# Patient Record
Sex: MALE | Race: WHITE | NOT HISPANIC OR LATINO | Employment: PART TIME | URBAN - METROPOLITAN AREA
[De-identification: names, ages, dates, MRNs, and addresses within clinical notes are randomized per-mention and may not be internally consistent; named-entity substitution may affect disease eponyms.]

---

## 2017-02-04 DIAGNOSIS — E08.9 DIABETES MELLITUS DUE TO UNDERLYING CONDITION WITHOUT COMPLICATION, WITHOUT LONG-TERM CURRENT USE OF INSULIN: ICD-10-CM

## 2017-02-06 RX ORDER — LIRAGLUTIDE 6 MG/ML
INJECTION SUBCUTANEOUS
Qty: 6 SYRINGE | Refills: 3 | Status: SHIPPED | OUTPATIENT
Start: 2017-02-06 | End: 2017-12-18 | Stop reason: SDUPTHER

## 2017-03-03 ENCOUNTER — OFFICE VISIT (OUTPATIENT)
Dept: INTERNAL MEDICINE | Facility: CLINIC | Age: 62
End: 2017-03-03
Payer: COMMERCIAL

## 2017-03-03 VITALS
WEIGHT: 179 LBS | HEART RATE: 86 BPM | RESPIRATION RATE: 18 BRPM | BODY MASS INDEX: 28.77 KG/M2 | HEIGHT: 66 IN | DIASTOLIC BLOOD PRESSURE: 70 MMHG | OXYGEN SATURATION: 97 % | SYSTOLIC BLOOD PRESSURE: 112 MMHG | TEMPERATURE: 99 F

## 2017-03-03 DIAGNOSIS — I10 BENIGN ESSENTIAL HTN: ICD-10-CM

## 2017-03-03 DIAGNOSIS — N52.9 ERECTILE DYSFUNCTION, UNSPECIFIED ERECTILE DYSFUNCTION TYPE: ICD-10-CM

## 2017-03-03 DIAGNOSIS — E78.5 HYPERLIPIDEMIA WITH TARGET LDL LESS THAN 130: ICD-10-CM

## 2017-03-03 DIAGNOSIS — E08.9 DIABETES MELLITUS DUE TO UNDERLYING CONDITION WITHOUT COMPLICATION, WITHOUT LONG-TERM CURRENT USE OF INSULIN: Primary | ICD-10-CM

## 2017-03-03 PROCEDURE — 99214 OFFICE O/P EST MOD 30 MIN: CPT | Mod: S$GLB,,, | Performed by: INTERNAL MEDICINE

## 2017-03-03 PROCEDURE — 3078F DIAST BP <80 MM HG: CPT | Mod: S$GLB,,, | Performed by: INTERNAL MEDICINE

## 2017-03-03 PROCEDURE — 3074F SYST BP LT 130 MM HG: CPT | Mod: S$GLB,,, | Performed by: INTERNAL MEDICINE

## 2017-03-03 PROCEDURE — 1160F RVW MEDS BY RX/DR IN RCRD: CPT | Mod: S$GLB,,, | Performed by: INTERNAL MEDICINE

## 2017-03-03 RX ORDER — PEN NEEDLE, DIABETIC 31 GX5/16"
NEEDLE, DISPOSABLE MISCELLANEOUS
Qty: 100 EACH | Refills: 11 | Status: SHIPPED | OUTPATIENT
Start: 2017-03-03 | End: 2018-04-10 | Stop reason: SDUPTHER

## 2017-03-03 RX ORDER — PEN NEEDLE, DIABETIC 31 GX5/16"
NEEDLE, DISPOSABLE MISCELLANEOUS
Refills: 11 | COMMUNITY
Start: 2016-11-30 | End: 2017-03-03 | Stop reason: SDUPTHER

## 2017-03-03 RX ORDER — SILDENAFIL 50 MG/1
50 TABLET, FILM COATED ORAL DAILY PRN
Qty: 30 TABLET | Refills: 5 | Status: SHIPPED | OUTPATIENT
Start: 2017-03-03 | End: 2017-10-30

## 2017-03-03 NOTE — MR AVS SNAPSHOT
"    [facility]  [address],  [address]  [place] [address]  Phone: [phone]  Fax: [phone]                  Rigoberto Quintana   3/3/2017 8:00 AM   Office Visit    Description:  Male : 1955   Provider:  China Gongora MD   Department:  [facility]           Reason for Visit     Results     Diabetes     Hypertension     Hyperlipidemia     Medication Refill           Diagnoses this Visit        Comments    Diabetes mellitus due to underlying condition without complication, without long-term current use of insulin    -  Primary     Erectile dysfunction, unspecified erectile dysfunction type         Hyperlipidemia with target LDL less than 130         Benign essential HTN                To Do List           Future Appointments        Provider Department Dept Phone    2017 7:40 AM China Gongora MD [facility] [phone]      Goals (5 Years of Data)     None      Follow-Up and Disposition     Return in about 3 months (around 6/3/2017).    Follow-up and Disposition History       These Medications        Disp Refills Start End    BD ULTRA-FINE SONAL PEN NEEDLES 32 gauge x 5/32" Ndle 100 each 11 3/3/2017     USE WHICHEVER NEEDLE COMBINES WITH THE VICTOZA PEN PLEASE.( MICRO ).    Pharmacy: [facility]/pharmacy #[id number] - [place] - [address] AT [facility] Ph #: [phone]       sildenafil (VIAGRA) 50 MG tablet 30 tablet 5 3/3/2017 3/3/2018    Take 1 tablet (50 mg total) by mouth daily as needed for Erectile Dysfunction. - Oral    Pharmacy: [facility]/pharmacy #[id number] - [place] - [address] AT [facility] Ph #: [phone]         [facility]     [facility] Care Line -  Assistance  Registered nurses in the [facility] provide clinical advisement, health education, appointment booking, and other advisory services.  Call for this free service at " "[phone].             Medications           START taking these NEW medications        Refills    BD ULTRA-FINE SONAL PEN NEEDLES 32 gauge x 5/32" Ndle 11    Sig: USE WHICHEVER NEEDLE COMBINES WITH THE VICTOZA PEN PLEASE.( MICRO ).    Class: Normal      STOP taking these medications     pen needle, diabetic 31 gauge x 1/4" Ndle Use whichever needle combines with the Victoza pen please.(  MICRO ).           Verify that the below list of medications is an accurate representation of the medications you are currently taking.  If none reported, the list may be blank. If incorrect, please contact your healthcare provider. Carry this list with you in case of emergency.           Current Medications     aspirin 81 MG Chew Take 81 mg by mouth once daily.    hydrochlorothiazide (HYDRODIURIL) 25 MG tablet Take 1 tablet (25 mg total) by mouth once daily.    losartan (COZAAR) 50 MG tablet Take 1 tablet (50 mg total) by mouth once daily.    pravastatin (PRAVACHOL) 80 MG tablet Take 1 tablet (80 mg total) by mouth once daily.    sildenafil (VIAGRA) 50 MG tablet Take 1 tablet (50 mg total) by mouth daily as needed for Erectile Dysfunction.    SITagliptan-metformin (JANUMET XR) 100-1,000 mg TM24 Take 1 tablet by mouth once daily.    VICTOZA 2-DESTIN 0.6 mg/0.1 mL (18 mg/3 mL) PnIj INJECT 0.6 MG INTO THE SKIN ONCE DAILY.    BD ULTRA-FINE SONAL PEN NEEDLES 32 gauge x 5/32" Ndle USE WHICHEVER NEEDLE COMBINES WITH THE VICTOZA PEN PLEASE.( MICRO ).           Clinical Reference Information           Your Vitals Were     BP Pulse Temp Resp Height Weight    112/70 (BP Location: Left arm, Patient Position: Sitting, BP Method: Manual) 86 98.8 °F (37.1 °C) (Oral) 18 5' 6" (1.676 m) 81.2 kg (179 lb 0.2 oz)    SpO2 BMI             97% 28.89 kg/m2         Blood Pressure          Most Recent Value    BP  112/70      Allergies as of 3/3/2017     No Known Allergies      Immunizations Administered on Date of Encounter - 3/3/2017     None      Orders " Placed During Today's Visit     Future Labs/Procedures Expected by Expires    Hemoglobin A1c  6/1/2017 8/30/2017    Microalbumin/creatinine urine ratio  6/1/2017 3/3/2018      [facility] Sign-Up     Activating your [facility] account is as easy as 1-2-3!     1) Visit [email or web address], select Sign Up Now, enter this activation code and your date of birth, then select Next.  [email or web address]  Expires: 4/17/2017  8:43 AM      2) Create a username and password to use when you visit [facility] in the future and select a security question in case you lose your password and select Next.    3) Enter your e-mail address and click Sign Up!    Additional Information  If you have questions, please e-mail [email or web address] or call [phone] to talk to our [facility] staff. Remember, [facility] is NOT to be used for urgent needs. For medical emergencies, dial 911.         Language Assistance Services     ATTENTION: Language assistance services are available, free of charge. Please call [phone].      ATENCIÓN: Si habla español, tiene a cavanaugh disposición servicios gratuitos de asistencia lingüística. Llame al [phone].     CHÚ Ý: N?u b?n nói Ti?ng Vi?t, có các d?ch v? h? tr? ngôn ng? mi?n phí dành cho b?n. G?i s? [phone].         [facility] complies with applicable Federal civil rights laws and does not discriminate on the basis of race, color, national origin, age, disability, or sex.

## 2017-03-03 NOTE — PROGRESS NOTES
Subjective:      Patient ID: Rigoberto Quintana is a 62 y.o. male.    Chief Complaint: Results (pt in for lab results); Diabetes; Hypertension; Hyperlipidemia; and Medication Refill (vonda needles need refill )    HPI:Due to the holidays, and Mardi Gras, there were some days of indescretion.  Otherwise doing very well.           03/01/17 0856 HDL 58 - Final result   03/01/17 0856 CHOL 167 - Final result   03/01/17 0856 TRIG 70 - Final result   03/01/17 0856 LDLCALC 95.0 - Final result   03/01/17 0856 CHOLHDL 34.7 - Final result   03/01/17 0856 NONHDLCHOL 109 - Final result   03/01/17 0856 TOTALCHOLEST 2.9 - Final result   03/01/17 0856 HGBA1C 7.3 High Final result   03/01/17 0856 WBC 9.08 - Final result   03/01/17 0856 RBC 4.82 - Final result   03/01/17 0856 HGB 14.9 - Final result   03/01/17 0856 HCT 43.7 - Final result   03/01/17 0856 MCH 30.9 - Final result   03/01/17 0856 RDW 13.1 - Final result   03/01/17 0856  - Final result   03/01/17 0856 MPV 10.8 - Final result   03/01/17 0942  High Final result   03/01/17 0942 BUN 20 - Final result   03/01/17 0942 CREATININE 0.97 - Final result   03/01/17 0942 CALCIUM 9.5 - Final result   03/01/17 0942  - Final result   03/01/17 0942 K 4.2 - Final result   03/01/17 0942  - Final result   03/01/17 0942 PROT 8.3 - Final result   03/01/17 0942 ALBUMIN 4.7 - Final result   03/01/17 0942 BILITOT 0.8 - Final result   03/01/17 0942 AST 33 - Final result   03/01/17 0942 ALKPHOS 68 - Final result   03/01/17 0942 CO2 30 High Final result   03/01/17 0942 ALT 32 - Final result   03/01/17 0942 ANIONGAP 12 - Final result   03/01/17 0942 EGFRNONAA >60.0 - Final result   03/01/17 0942 ESTGFRAFRICA >60.0 - Final result   03/01/17 0856           Review of Systems   Constitutional: Negative.    HENT: Negative.    Respiratory: Negative.    Cardiovascular: Negative.    Gastrointestinal: Negative.    Endocrine: Positive for polyuria.   Genitourinary: Positive for  "frequency.   Musculoskeletal: Negative.    Allergic/Immunologic: Negative.    Neurological: Negative.    Psychiatric/Behavioral: Negative.    All other systems reviewed and are negative.      Objective:   /70 (BP Location: Left arm, Patient Position: Sitting, BP Method: Manual)  Pulse 86  Temp 98.8 °F (37.1 °C) (Oral)   Resp 18  Ht 5' 6" (1.676 m)  Wt 81.2 kg (179 lb 0.2 oz)  SpO2 97%  BMI 28.89 kg/m2    Physical Exam   Constitutional: He is oriented to person, place, and time. He appears well-developed and well-nourished.   HENT:   Head: Normocephalic and atraumatic.   Mouth/Throat: Oropharynx is clear and moist.   Eyes: Conjunctivae are normal. Pupils are equal, round, and reactive to light.   Neck: Normal range of motion. Neck supple.   Cardiovascular: Normal rate, regular rhythm and normal heart sounds.    Pulmonary/Chest: Effort normal and breath sounds normal.   Abdominal: Soft. Bowel sounds are normal.   Musculoskeletal: Normal range of motion.   Neurological: He is alert and oriented to person, place, and time.   Skin: Skin is warm and dry.   Psychiatric: He has a normal mood and affect. His behavior is normal.   Nursing note and vitals reviewed.      Assessment:     1. Diabetes mellitus due to underlying condition without complication, without long-term current use of insulin    2. Erectile dysfunction, unspecified erectile dysfunction type    3. Hyperlipidemia with target LDL less than 130    4. Benign essential HTN      Plan:     Diabetes mellitus due to underlying condition without complication, without long-term current use of insulin  -     BD ULTRA-FINE SONAL PEN NEEDLES 32 gauge x 5/32" Ndle; USE WHICHEVER NEEDLE COMBINES WITH THE VICTOZA PEN PLEASE.( MICRO ).  Dispense: 100 each; Refill: 11  -     Hemoglobin A1c; Future; Expected date: 6/1/17  -     Microalbumin/creatinine urine ratio; Future; Expected date: 6/1/17    Erectile dysfunction, unspecified erectile dysfunction type  -     " sildenafil (VIAGRA) 50 MG tablet; Take 1 tablet (50 mg total) by mouth daily as needed for Erectile Dysfunction.  Dispense: 30 tablet; Refill: 5    Hyperlipidemia with target LDL less than 130    Benign essential HTN

## 2017-05-11 DIAGNOSIS — Z12.5 SCREENING PSA (PROSTATE SPECIFIC ANTIGEN): ICD-10-CM

## 2017-05-11 DIAGNOSIS — E78.5 HYPERLIPIDEMIA, UNSPECIFIED HYPERLIPIDEMIA TYPE: Primary | ICD-10-CM

## 2017-05-11 DIAGNOSIS — E11.9 TYPE 2 DIABETES MELLITUS WITHOUT COMPLICATION, UNSPECIFIED LONG TERM INSULIN USE STATUS: ICD-10-CM

## 2017-05-11 DIAGNOSIS — I10 BENIGN HYPERTENSION: ICD-10-CM

## 2017-06-13 ENCOUNTER — OFFICE VISIT (OUTPATIENT)
Dept: INTERNAL MEDICINE | Facility: CLINIC | Age: 62
End: 2017-06-13
Payer: COMMERCIAL

## 2017-06-13 VITALS
WEIGHT: 178 LBS | OXYGEN SATURATION: 98 % | RESPIRATION RATE: 18 BRPM | SYSTOLIC BLOOD PRESSURE: 120 MMHG | HEART RATE: 82 BPM | BODY MASS INDEX: 28.61 KG/M2 | HEIGHT: 66 IN | DIASTOLIC BLOOD PRESSURE: 65 MMHG | TEMPERATURE: 98 F

## 2017-06-13 DIAGNOSIS — I10 BENIGN ESSENTIAL HTN: Primary | ICD-10-CM

## 2017-06-13 DIAGNOSIS — E78.5 HYPERLIPIDEMIA WITH TARGET LDL LESS THAN 130: ICD-10-CM

## 2017-06-13 DIAGNOSIS — E08.9 DIABETES MELLITUS DUE TO UNDERLYING CONDITION WITHOUT COMPLICATION, WITHOUT LONG-TERM CURRENT USE OF INSULIN: ICD-10-CM

## 2017-06-13 PROCEDURE — 99214 OFFICE O/P EST MOD 30 MIN: CPT | Mod: S$GLB,,, | Performed by: INTERNAL MEDICINE

## 2017-06-13 NOTE — PROGRESS NOTES
"Subjective:      Patient ID: Rigoberto Quintana is a 62 y.o. male.    Chief Complaint: Results (lab results.....send all lab request to [facility] PLEASE); Diabetes; and Medication Refill (pt requesting 90 day supply)    HPI:  62y/oWM seen by:  -Dr. Singh,Cardio  -,[facility]  Has just recently retired, and having a life changing experience.  Exercising 3xweek.  HAS TO GO TO [facility].    HGBA1C 7.2  All the rest negative abnormals.    Review of Systems   Respiratory: Positive for chest tightness. Negative for shortness of breath.    Cardiovascular: Negative for chest pain and palpitations.   All other systems reviewed and are negative.      Objective:   /65 (BP Location: Right arm, Patient Position: Sitting, BP Method: Manual)   Pulse 82   Temp 98.3 °F (36.8 °C) (Oral)   Resp 18   Ht 5' 6" (1.676 m)   Wt 80.7 kg (178 lb 0.3 oz)   SpO2 98%   BMI 28.73 kg/m²     Physical Exam   Constitutional: He is oriented to person, place, and time. He appears well-nourished.   HENT:   Head: Normocephalic and atraumatic.   Mouth/Throat: Oropharynx is clear and moist.   bilat hearing aids.   Eyes: Conjunctivae are normal. Pupils are equal, round, and reactive to light.   Neck: Normal range of motion. Neck supple.   Cardiovascular: Normal rate, regular rhythm and normal heart sounds.    Pulmonary/Chest: Effort normal and breath sounds normal.   Abdominal: Soft. Bowel sounds are normal.   Musculoskeletal: Normal range of motion.   Neurological: He is alert and oriented to person, place, and time.   Skin: Skin is warm and dry.   Psychiatric: He has a normal mood and affect. His behavior is normal.   Nursing note and vitals reviewed.      Assessment:     1. Benign essential HTN    2. Diabetes mellitus due to underlying condition without complication, without long-term current use of insulin    3. Hyperlipidemia with target LDL less than 130    Reviewed meds and labs.  Plan:     Benign essential HTN  -     Basic metabolic " panel; Future; Expected date: 09/13/2017  -     CBC auto differential; Future; Expected date: 09/13/2017    Diabetes mellitus due to underlying condition without complication, without long-term current use of insulin  -     Hemoglobin A1c; Future; Expected date: 09/11/2017  -     Microalbumin/creatinine urine ratio; Future; Expected date: 09/11/2017    Hyperlipidemia with target LDL less than 130  -     Lipid panel; Future; Expected date: 09/11/2017

## 2017-09-15 ENCOUNTER — DOCUMENTATION ONLY (OUTPATIENT)
Dept: INTERNAL MEDICINE | Facility: CLINIC | Age: 62
End: 2017-09-15

## 2017-09-15 NOTE — PROGRESS NOTES
Subjective:      Patient ID: Rigoberto Quintana is a 62 y.o. male.    Chief Complaint: No chief complaint on file.    HPI: 62 y.o. White male        Review of Systems    Objective:   There were no vitals taken for this visit.    Physical Exam    Assessment:     No diagnosis found.  Plan:     There are no diagnoses linked to this encounter.

## 2017-09-19 ENCOUNTER — TELEPHONE (OUTPATIENT)
Dept: INTERNAL MEDICINE | Facility: CLINIC | Age: 62
End: 2017-09-19

## 2017-09-19 DIAGNOSIS — I10 BENIGN ESSENTIAL HTN: Primary | ICD-10-CM

## 2017-09-19 DIAGNOSIS — E08.9 DIABETES MELLITUS DUE TO UNDERLYING CONDITION WITHOUT COMPLICATION, WITHOUT LONG-TERM CURRENT USE OF INSULIN: ICD-10-CM

## 2017-09-19 DIAGNOSIS — E78.5 HYPERLIPIDEMIA WITH TARGET LDL LESS THAN 130: ICD-10-CM

## 2017-09-19 NOTE — TELEPHONE ENCOUNTER
----- Message from Aura Dubose sent at 9/19/2017  9:20 AM CDT -----  Contact: patient   Patient needs lab orders put in on a [facility] form for our lab. He would like to go on Oct.13th at 7:30

## 2017-10-10 ENCOUNTER — TELEPHONE (OUTPATIENT)
Dept: INTERNAL MEDICINE | Facility: CLINIC | Age: 62
End: 2017-10-10

## 2017-10-10 DIAGNOSIS — E78.5 HYPERLIPIDEMIA, UNSPECIFIED HYPERLIPIDEMIA TYPE: ICD-10-CM

## 2017-10-10 DIAGNOSIS — I10 ESSENTIAL HYPERTENSION: Primary | ICD-10-CM

## 2017-10-10 DIAGNOSIS — E11.9 DIABETES MELLITUS WITHOUT COMPLICATION: ICD-10-CM

## 2017-10-10 NOTE — TELEPHONE ENCOUNTER
----- Message from Aura Dubose sent at 10/10/2017  1:02 PM CDT -----  Contact: Patient   Patient would like to know if his lab orders are in at [facility].  Call [phone]

## 2017-10-17 DIAGNOSIS — I10 BENIGN ESSENTIAL HTN: ICD-10-CM

## 2017-10-17 DIAGNOSIS — E78.5 HYPERLIPIDEMIA WITH TARGET LDL LESS THAN 130: ICD-10-CM

## 2017-10-18 RX ORDER — SITAGLIPTIN AND METFORMIN HYDROCHLORIDE 1000; 100 MG/1; MG/1
1 TABLET, FILM COATED, EXTENDED RELEASE ORAL DAILY
Qty: 90 TABLET | Refills: 3 | Status: SHIPPED | OUTPATIENT
Start: 2017-10-18

## 2017-10-18 RX ORDER — HYDROCHLOROTHIAZIDE 25 MG/1
25 TABLET ORAL DAILY
Qty: 90 TABLET | Refills: 3 | Status: SHIPPED | OUTPATIENT
Start: 2017-10-18

## 2017-10-18 RX ORDER — LOSARTAN POTASSIUM 50 MG/1
50 TABLET ORAL DAILY
Qty: 90 TABLET | Refills: 3 | Status: SHIPPED | OUTPATIENT
Start: 2017-10-18

## 2017-10-30 ENCOUNTER — OFFICE VISIT (OUTPATIENT)
Dept: INTERNAL MEDICINE | Facility: CLINIC | Age: 62
End: 2017-10-30
Payer: COMMERCIAL

## 2017-10-30 VITALS
SYSTOLIC BLOOD PRESSURE: 122 MMHG | HEART RATE: 71 BPM | DIASTOLIC BLOOD PRESSURE: 70 MMHG | BODY MASS INDEX: 27.8 KG/M2 | TEMPERATURE: 99 F | RESPIRATION RATE: 18 BRPM | WEIGHT: 173 LBS | HEIGHT: 66 IN | OXYGEN SATURATION: 98 %

## 2017-10-30 DIAGNOSIS — E08.9 DIABETES MELLITUS DUE TO UNDERLYING CONDITION WITHOUT COMPLICATION, WITHOUT LONG-TERM CURRENT USE OF INSULIN: Primary | ICD-10-CM

## 2017-10-30 DIAGNOSIS — E78.5 HYPERLIPIDEMIA WITH TARGET LDL LESS THAN 130: ICD-10-CM

## 2017-10-30 DIAGNOSIS — I10 BENIGN ESSENTIAL HTN: ICD-10-CM

## 2017-10-30 PROCEDURE — 99214 OFFICE O/P EST MOD 30 MIN: CPT | Mod: S$GLB,,, | Performed by: INTERNAL MEDICINE

## 2017-10-30 NOTE — PROGRESS NOTES
"Subjective:      Patient ID: Rigoberto Quintana is a 62 y.o. male.    Chief Complaint: Follow-up (6 month follow up) and Results (Lab Results)    HPI: 62 y.o. White male  , going to [place] to baby sit his 2 grandchildren while  The parents go to [place] to set up tele medicine.  He is going to get the Hep A vaccine.  Otherwise doing well.  Went to Optho normal...  Went to Cardio..  /70 (BP Location: Left arm, Patient Position: Sitting, BP Method: Large (Manual))   Pulse 71   Temp 98.6 °F (37 °C) (Oral)   Resp 18   Ht 5' 6" (1.676 m)   Wt 78.5 kg (173 lb)   SpO2 98%   BMI 27.92 kg/m²                                                                                   .    10/13/17 0818 TSH 1.250 - Final result   10/13/17 0818 HDL 56 - Final result   10/13/17 0818 CHOL 146 - Final result   10/13/17 0818 TRIG 68 - Final result   10/13/17 0818 LDLCALC 76.4 - Final result   10/13/17 0818 CHOLHDL 38.4 - Final result   10/13/17 0818 NONHDLCHOL 90 - Final result   10/13/17 0818 TOTALCHOLEST 2.6 - Final result   10/13/17 0818 HGBA1C 6.8 High Final result   10/13/17 0818 WBC 7.55 - Final result   10/13/17 0818 RBC 4.47 Low Final result   10/13/17 0818 HGB 13.9 Low Final result   10/13/17 0818 HCT 40.7 - Final result   10/13/17 0818 MCH 31.1 High Final result   10/13/17 0818 RDW 13.0 - Final result   10/13/17 0818  - Final result   10/13/17 0818 MPV 11.0 - Final result   10/13/17 0818  High Final result   10/13/17 0818 BUN 19 - Final result   10/13/17 0818 CREATININE 1.08 - Final result   10/13/17 0818 CALCIUM 9.4 - Final result   10/13/17 0818  - Final result   10/13/17 0818 K 4.0 - Final result   10/13/17 0818  - Final result   10/13/17 0818 PROT 7.2 - Final result   10/13/17 0818 ALBUMIN 4.3 - Final result   10/13/17 0818 BILITOT 0.5 - Final result   10/13/17 0818 AST 41 - Final result   10/13/17 0818 ALKPHOS 50 - Final result   10/13/17 0818 CO2 29 - Final result   10/13/17 0818 ALT " "36 - Final result   10/13/17 0818 ANIONGAP 12 - Final result   10/13/17 0818 EGFRNONAA >60.0 - Final result   10/13/17 0818 ESTGFRAFRICA >60.0 - Final result   10/13/17 0818             Review of Systems   Constitutional: Negative.    HENT: Negative.    Eyes: Negative.    Respiratory: Negative.    Cardiovascular: Negative.    Gastrointestinal: Negative.    Endocrine: Negative.    Musculoskeletal: Negative.    Skin: Negative.    Allergic/Immunologic: Negative.    Neurological: Negative.    Hematological: Negative.    Psychiatric/Behavioral: Negative.        Objective:   /70 (BP Location: Left arm, Patient Position: Sitting, BP Method: Large (Manual))   Pulse 71   Temp 98.6 °F (37 °C) (Oral)   Resp 18   Ht 5' 6" (1.676 m)   Wt 78.5 kg (173 lb)   SpO2 98%   BMI 27.92 kg/m²     Physical Exam   General Appearance:    Alert, cooperative, no distress, appears stated age   Head:    Normocephalic, without obvious abnormality, atraumatic   Eyes:    PERRL, conjunctiva/corneas clear, EOM's intact, fundi     benign, both eyes   Ears:    Normal TM's and external ear canals, both ears   Nose:   Nares normal, septum midline, mucosa normal, no drainage     or sinus tenderness   Throat:   Lips, mucosa, and tongue normal; teeth and gums normal   Neck:   Supple, symmetrical, trachea midline, no adenopathy;     thyroid:  no enlargement/tenderness/nodules; no carotid    bruit or JVD   Back:     Symmetric, no curvature, ROM normal, no CVA tenderness   Lungs:     Clear to auscultation bilaterally, respirations unlabored   Chest Wall:    No tenderness or deformity    Heart:    Regular rate and rhythm, S1 and S2 normal, no murmur, rub    or gallop   Breast Exam:    No tenderness, masses, or nipple abnormality   Abdomen:     Soft, non-tender, bowel sounds active all four quadrants,     no masses, no organomegaly   Genitalia:    Normal female without lesion, discharge or tenderness   Rectal:    Normal tone, normal prostate, no " masses or tenderness;    guaiac negative stool   Extremities:   Extremities normal, atraumatic, no cyanosis or edema   Pulses:   2+ and symmetric all extremities   Skin:   Skin color, texture, turgor normal, no rashes or lesions   Lymph nodes:   Cervical, supraclavicular, and axillary nodes normal   Neurologic:   CNII-XII intact, normal strength, sensation and reflexes     throughout       Assessment:     1. Diabetes mellitus due to underlying condition without complication, without long-term current use of insulin    2. Benign essential HTN    3. Hyperlipidemia with target LDL less than 130    Better.  Plan:     Diabetes mellitus due to underlying condition without complication, without long-term current use of insulin    Benign essential HTN    Hyperlipidemia with target LDL less than 130    Same therapy.

## 2017-11-28 ENCOUNTER — TELEPHONE (OUTPATIENT)
Dept: INTERNAL MEDICINE | Facility: CLINIC | Age: 62
End: 2017-11-28

## 2017-11-28 NOTE — TELEPHONE ENCOUNTER
Called [facility] and they don't see where a prior auth is req and that I would call the patient and find out what is going on.     Spoke with patient and he states that his insurance will require auth starting 2018. He states that he just wanted to let us know.

## 2017-11-28 NOTE — TELEPHONE ENCOUNTER
----- Message from Auar Dubose sent at 11/28/2017  1:43 PM CST -----  Contact: wife   Misha is requesting prior authorization for patients Victoza .

## 2017-12-18 DIAGNOSIS — E08.9 DIABETES MELLITUS DUE TO UNDERLYING CONDITION WITHOUT COMPLICATION, WITHOUT LONG-TERM CURRENT USE OF INSULIN: ICD-10-CM

## 2017-12-19 RX ORDER — LIRAGLUTIDE 6 MG/ML
INJECTION SUBCUTANEOUS
Qty: 6 SYRINGE | Refills: 3 | Status: SHIPPED | OUTPATIENT
Start: 2017-12-19

## 2018-01-12 DIAGNOSIS — E78.5 HYPERLIPIDEMIA WITH TARGET LDL LESS THAN 130: ICD-10-CM

## 2018-01-12 DIAGNOSIS — I10 BENIGN ESSENTIAL HTN: ICD-10-CM

## 2018-01-12 RX ORDER — PRAVASTATIN SODIUM 80 MG/1
80 TABLET ORAL DAILY
Qty: 90 TABLET | Refills: 0 | Status: SHIPPED | OUTPATIENT
Start: 2018-01-12 | End: 2018-04-13 | Stop reason: SDUPTHER

## 2018-02-15 RX ORDER — INSULIN PUMP SYRINGE, 3 ML
EACH MISCELLANEOUS
Qty: 1 EACH | Refills: 0 | Status: SHIPPED | OUTPATIENT
Start: 2018-02-15 | End: 2019-02-15

## 2018-02-15 RX ORDER — ISOPROPYL ALCOHOL 70 ML/100ML
SWAB TOPICAL
Qty: 200 EACH | Refills: 3 | Status: SHIPPED | OUTPATIENT
Start: 2018-02-15

## 2018-02-15 RX ORDER — LANCETS
EACH MISCELLANEOUS
Qty: 200 EACH | Refills: 3 | Status: SHIPPED | OUTPATIENT
Start: 2018-02-15

## 2018-02-15 NOTE — TELEPHONE ENCOUNTER
----- Message from Aura Dubose sent at 2/15/2018  1:28 PM CST -----  Contact: Marilou/ wife [phone]  Patient needs a new prescription for a Glucose meter ,strips and lancets Sent to  [facility] in [place]. Call [phone]

## 2018-04-10 DIAGNOSIS — E08.9 DIABETES MELLITUS DUE TO UNDERLYING CONDITION WITHOUT COMPLICATION, WITHOUT LONG-TERM CURRENT USE OF INSULIN: ICD-10-CM

## 2018-04-10 RX ORDER — PEN NEEDLE, DIABETIC 31 GX5/16"
NEEDLE, DISPOSABLE MISCELLANEOUS
Qty: 100 EACH | Refills: 3 | Status: SHIPPED | OUTPATIENT
Start: 2018-04-10

## 2018-04-13 DIAGNOSIS — I10 BENIGN ESSENTIAL HTN: ICD-10-CM

## 2018-04-13 DIAGNOSIS — E78.5 HYPERLIPIDEMIA WITH TARGET LDL LESS THAN 130: ICD-10-CM

## 2018-04-13 RX ORDER — PRAVASTATIN SODIUM 80 MG/1
80 TABLET ORAL DAILY
Qty: 90 TABLET | Refills: 0 | Status: SHIPPED | OUTPATIENT
Start: 2018-04-13 | End: 2018-04-18 | Stop reason: SDUPTHER

## 2018-04-18 DIAGNOSIS — I10 BENIGN ESSENTIAL HTN: ICD-10-CM

## 2018-04-18 DIAGNOSIS — E78.5 HYPERLIPIDEMIA WITH TARGET LDL LESS THAN 130: ICD-10-CM

## 2018-04-18 RX ORDER — PRAVASTATIN SODIUM 80 MG/1
80 TABLET ORAL DAILY
Qty: 90 TABLET | Refills: 0 | Status: SHIPPED | OUTPATIENT
Start: 2018-04-18

## 2019-05-20 DIAGNOSIS — E08.9 DIABETES MELLITUS DUE TO UNDERLYING CONDITION WITHOUT COMPLICATION, WITHOUT LONG-TERM CURRENT USE OF INSULIN: Primary | ICD-10-CM

## 2019-05-20 DIAGNOSIS — Z11.59 NEED FOR HEPATITIS C SCREENING TEST: ICD-10-CM

## 2021-10-07 ENCOUNTER — IMMUNIZATION (OUTPATIENT)
Dept: PRIMARY CARE CLINIC | Facility: CLINIC | Age: 66
End: 2021-10-07
Payer: COMMERCIAL

## 2021-10-07 DIAGNOSIS — Z23 NEED FOR VACCINATION: Primary | ICD-10-CM

## 2021-10-07 PROCEDURE — 0003A COVID-19, MRNA, LNP-S, PF, 30 MCG/0.3 ML DOSE VACCINE: CPT | Mod: CV19,PBBFAC | Performed by: INTERNAL MEDICINE

## 2021-10-07 PROCEDURE — 91300 COVID-19, MRNA, LNP-S, PF, 30 MCG/0.3 ML DOSE VACCINE: CPT | Mod: PBBFAC | Performed by: INTERNAL MEDICINE

## 2024-09-16 ENCOUNTER — OFFICE VISIT (OUTPATIENT)
Dept: URGENT CARE | Facility: CLINIC | Age: 69
End: 2024-09-16
Payer: COMMERCIAL

## 2024-09-16 VITALS
SYSTOLIC BLOOD PRESSURE: 132 MMHG | HEIGHT: 66 IN | OXYGEN SATURATION: 99 % | HEART RATE: 71 BPM | BODY MASS INDEX: 27.07 KG/M2 | RESPIRATION RATE: 17 BRPM | TEMPERATURE: 98 F | DIASTOLIC BLOOD PRESSURE: 82 MMHG | WEIGHT: 168.44 LBS

## 2024-09-16 DIAGNOSIS — S90.212A CONTUSION OF LEFT GREAT TOE WITH DAMAGE TO NAIL, INITIAL ENCOUNTER: Primary | ICD-10-CM

## 2024-09-16 PROCEDURE — 90471 IMMUNIZATION ADMIN: CPT | Mod: S$GLB,,, | Performed by: FAMILY MEDICINE

## 2024-09-16 PROCEDURE — 90714 TD VACC NO PRESV 7 YRS+ IM: CPT | Mod: S$GLB,,, | Performed by: FAMILY MEDICINE

## 2024-09-16 PROCEDURE — 99203 OFFICE O/P NEW LOW 30 MIN: CPT | Mod: 25,S$GLB,, | Performed by: FAMILY MEDICINE

## 2024-09-16 PROCEDURE — 73630 X-RAY EXAM OF FOOT: CPT | Mod: LT,S$GLB,, | Performed by: RADIOLOGY

## 2024-09-16 RX ORDER — METFORMIN HYDROCHLORIDE 500 MG/1
2000 TABLET, EXTENDED RELEASE ORAL
COMMUNITY

## 2024-09-16 RX ORDER — SEMAGLUTIDE 1.34 MG/ML
INJECTION, SOLUTION SUBCUTANEOUS
COMMUNITY

## 2024-09-16 RX ORDER — NAPROXEN 500 MG/1
500 TABLET ORAL 2 TIMES DAILY WITH MEALS
Qty: 30 TABLET | Refills: 2 | Status: SHIPPED | OUTPATIENT
Start: 2024-09-16 | End: 2025-09-16

## 2024-09-16 NOTE — PROGRESS NOTES
"Subjective:      Patient ID: Rigoberto Quintana is a 69 y.o. male.    Vitals:  height is 5' 6" (1.676 m) and weight is 76.4 kg (168 lb 6.9 oz). His oral temperature is 98.4 °F (36.9 °C). His blood pressure is 132/82 and his pulse is 71. His respiration is 17 and oxygen saturation is 99%.     Chief Complaint: Toe Injury    Pt states he was carrying something similar to a drain and it fell on his left toe. Pt has not taking anything for pain.Injury occurred at home 10 am  Pain is bearable      Toe Injury  This is a new problem. The current episode started today. The problem occurs constantly. The problem has been unchanged. Pertinent negatives include no anorexia, arthralgias or change in bowel habit. Nothing aggravates the symptoms. He has tried nothing for the symptoms. The treatment provided no relief.       Musculoskeletal:  Negative for joint pain.      Objective:     Physical Exam   Constitutional: He is oriented to person, place, and time. He appears well-developed. He is cooperative.  Non-toxic appearance. He does not appear ill. No distress.   HENT:   Head: Normocephalic and atraumatic.   Ears:   Right Ear: Hearing, tympanic membrane, external ear and ear canal normal.   Left Ear: Hearing, tympanic membrane, external ear and ear canal normal.   Nose: Nose normal. No mucosal edema, rhinorrhea or nasal deformity. No epistaxis. Right sinus exhibits no maxillary sinus tenderness and no frontal sinus tenderness. Left sinus exhibits no maxillary sinus tenderness and no frontal sinus tenderness.   Mouth/Throat: Uvula is midline, oropharynx is clear and moist and mucous membranes are normal. No trismus in the jaw. Normal dentition. No uvula swelling. No posterior oropharyngeal erythema.   Eyes: Conjunctivae and lids are normal. Right eye exhibits no discharge. Left eye exhibits no discharge. No scleral icterus.   Neck: Trachea normal and phonation normal. Neck supple.   Cardiovascular: Normal rate, regular rhythm, " normal heart sounds and normal pulses.   Pulmonary/Chest: Effort normal and breath sounds normal. No respiratory distress.   Abdominal: Normal appearance and bowel sounds are normal. He exhibits no distension and no mass. Soft. There is no abdominal tenderness.   Musculoskeletal: Normal range of motion.         General: No deformity. Normal range of motion.      Comments: Left big toe with mild swelling and superficial bruising   Able to bear weight on,   Neurological: He is alert and oriented to person, place, and time. He exhibits normal muscle tone. Coordination normal.   Skin: Skin is warm, dry, intact, not diaphoretic and not pale.   Psychiatric: His speech is normal and behavior is normal. Judgment and thought content normal.   Nursing note and vitals reviewed.      Assessment:     1. Contusion of left great toe with damage to nail, initial encounter        Plan:       Contusion of left great toe with damage to nail, initial encounter  -     X-Ray Foot Complete Left; Future; Expected date: 09/16/2024      Apply ice    Gus taping    Tylenol 2 po tid prn pain

## 2024-12-06 ENCOUNTER — HOSPITAL ENCOUNTER (INPATIENT)
Facility: HOSPITAL | Age: 69
LOS: 4 days | Discharge: HOME | DRG: 270 | End: 2024-12-10
Attending: INTERNAL MEDICINE | Admitting: INTERNAL MEDICINE
Payer: MEDICARE

## 2024-12-06 DIAGNOSIS — D72.829 LEUKOCYTOSIS, UNSPECIFIED TYPE: ICD-10-CM

## 2024-12-06 DIAGNOSIS — I21.3 STEMI (ST ELEVATION MYOCARDIAL INFARCTION): Primary | ICD-10-CM

## 2024-12-06 LAB
APTT PPP: >150 SEC (ref 21–32)
LACTATE SERPL-SCNC: 3.3 MMOL/L (ref 0.5–2.2)
MAGNESIUM SERPL-MCNC: 1.6 MG/DL (ref 1.6–2.6)
POC PERFORMED BY: NORMAL
POC PERFORMED BY: NORMAL
POC SATURATED O2: 45.7 %
POC SATURATED O2: 50.9 %
POCT GLUCOSE: 310 MG/DL (ref 70–110)
POCT GLUCOSE: 346 MG/DL (ref 70–110)
SPECIMEN SOURCE: NORMAL
SPECIMEN SOURCE: NORMAL

## 2024-12-06 PROCEDURE — B240ZZ3 ULTRASONOGRAPHY OF SINGLE CORONARY ARTERY, INTRAVASCULAR: ICD-10-PCS | Performed by: INTERNAL MEDICINE

## 2024-12-06 PROCEDURE — 93460 R&L HRT ART/VENTRICLE ANGIO: CPT | Mod: 26,XU,51, | Performed by: INTERNAL MEDICINE

## 2024-12-06 PROCEDURE — 85730 THROMBOPLASTIN TIME PARTIAL: CPT | Mod: 91 | Performed by: INTERNAL MEDICINE

## 2024-12-06 PROCEDURE — 27201423 OPTIME MED/SURG SUP & DEVICES STERILE SUPPLY: Performed by: INTERNAL MEDICINE

## 2024-12-06 PROCEDURE — B211YZZ FLUOROSCOPY OF MULTIPLE CORONARY ARTERIES USING OTHER CONTRAST: ICD-10-PCS | Performed by: INTERNAL MEDICINE

## 2024-12-06 PROCEDURE — 5A02210 ASSISTANCE WITH CARDIAC OUTPUT USING BALLOON PUMP, CONTINUOUS: ICD-10-PCS | Performed by: INTERNAL MEDICINE

## 2024-12-06 PROCEDURE — 4A023N6 MEASUREMENT OF CARDIAC SAMPLING AND PRESSURE, RIGHT HEART, PERCUTANEOUS APPROACH: ICD-10-PCS | Performed by: INTERNAL MEDICINE

## 2024-12-06 PROCEDURE — 27000190 HC CPAP FULL FACE MASK W/VALVE

## 2024-12-06 PROCEDURE — 92978 ENDOLUMINL IVUS OCT C 1ST: CPT | Mod: LM | Performed by: INTERNAL MEDICINE

## 2024-12-06 PROCEDURE — 93460 R&L HRT ART/VENTRICLE ANGIO: CPT | Mod: XU | Performed by: INTERNAL MEDICINE

## 2024-12-06 PROCEDURE — 92978 ENDOLUMINL IVUS OCT C 1ST: CPT | Mod: 26,LM,, | Performed by: INTERNAL MEDICINE

## 2024-12-06 PROCEDURE — 94761 N-INVAS EAR/PLS OXIMETRY MLT: CPT

## 2024-12-06 PROCEDURE — 63600175 PHARM REV CODE 636 W HCPCS: Performed by: INTERNAL MEDICINE

## 2024-12-06 PROCEDURE — 25000003 PHARM REV CODE 250: Performed by: INTERNAL MEDICINE

## 2024-12-06 PROCEDURE — 93010 ELECTROCARDIOGRAM REPORT: CPT | Mod: ,,, | Performed by: STUDENT IN AN ORGANIZED HEALTH CARE EDUCATION/TRAINING PROGRAM

## 2024-12-06 PROCEDURE — 33967 INSERT I-AORT PERCUT DEVICE: CPT | Performed by: INTERNAL MEDICINE

## 2024-12-06 PROCEDURE — 92941 PRQ TRLML REVSC TOT OCCL AMI: CPT | Mod: LM,,, | Performed by: INTERNAL MEDICINE

## 2024-12-06 PROCEDURE — 99900035 HC TECH TIME PER 15 MIN (STAT)

## 2024-12-06 PROCEDURE — 27000221 HC OXYGEN, UP TO 24 HOURS

## 2024-12-06 PROCEDURE — C1753 CATH, INTRAVAS ULTRASOUND: HCPCS | Performed by: INTERNAL MEDICINE

## 2024-12-06 PROCEDURE — 027034Z DILATION OF CORONARY ARTERY, ONE ARTERY WITH DRUG-ELUTING INTRALUMINAL DEVICE, PERCUTANEOUS APPROACH: ICD-10-PCS | Performed by: INTERNAL MEDICINE

## 2024-12-06 PROCEDURE — C1751 CATH, INF, PER/CENT/MIDLINE: HCPCS | Performed by: INTERNAL MEDICINE

## 2024-12-06 PROCEDURE — C9606 PERC D-E COR REVASC W AMI S: HCPCS | Mod: LM | Performed by: INTERNAL MEDICINE

## 2024-12-06 PROCEDURE — 83605 ASSAY OF LACTIC ACID: CPT | Performed by: INTERNAL MEDICINE

## 2024-12-06 PROCEDURE — 85347 COAGULATION TIME ACTIVATED: CPT | Performed by: INTERNAL MEDICINE

## 2024-12-06 PROCEDURE — 99291 CRITICAL CARE FIRST HOUR: CPT | Mod: 25,,, | Performed by: INTERNAL MEDICINE

## 2024-12-06 PROCEDURE — 5A09457 ASSISTANCE WITH RESPIRATORY VENTILATION, 24-96 CONSECUTIVE HOURS, CONTINUOUS POSITIVE AIRWAY PRESSURE: ICD-10-PCS | Performed by: INTERNAL MEDICINE

## 2024-12-06 PROCEDURE — 33967 INSERT I-AORT PERCUT DEVICE: CPT | Mod: 51,,, | Performed by: INTERNAL MEDICINE

## 2024-12-06 PROCEDURE — 33967 INSERT I-AORT PERCUT DEVICE: CPT

## 2024-12-06 PROCEDURE — 63600175 PHARM REV CODE 636 W HCPCS: Mod: JZ,JG | Performed by: INTERNAL MEDICINE

## 2024-12-06 PROCEDURE — C1725 CATH, TRANSLUMIN NON-LASER: HCPCS | Performed by: INTERNAL MEDICINE

## 2024-12-06 PROCEDURE — 94660 CPAP INITIATION&MGMT: CPT

## 2024-12-06 PROCEDURE — C1769 GUIDE WIRE: HCPCS | Performed by: INTERNAL MEDICINE

## 2024-12-06 PROCEDURE — 83735 ASSAY OF MAGNESIUM: CPT | Performed by: INTERNAL MEDICINE

## 2024-12-06 PROCEDURE — C1887 CATHETER, GUIDING: HCPCS | Performed by: INTERNAL MEDICINE

## 2024-12-06 PROCEDURE — C1876 STENT, NON-COA/NON-COV W/DEL: HCPCS | Performed by: INTERNAL MEDICINE

## 2024-12-06 PROCEDURE — 25500020 PHARM REV CODE 255: Performed by: INTERNAL MEDICINE

## 2024-12-06 PROCEDURE — C1894 INTRO/SHEATH, NON-LASER: HCPCS | Performed by: INTERNAL MEDICINE

## 2024-12-06 PROCEDURE — 20000000 HC ICU ROOM

## 2024-12-06 PROCEDURE — 93005 ELECTROCARDIOGRAM TRACING: CPT

## 2024-12-06 DEVICE — EVEROLIMUS-ELUTING PLATINUM CHROMIUM CORONARY STENT SYSTEM
Type: IMPLANTABLE DEVICE | Site: HEART | Status: FUNCTIONAL
Brand: SYNERGY MEGATRON™

## 2024-12-06 RX ORDER — PHENYLEPHRINE HCL IN 0.9% NACL 1 MG/10 ML
SYRINGE (ML) INTRAVENOUS
Status: DISCONTINUED | OUTPATIENT
Start: 2024-12-06 | End: 2024-12-06 | Stop reason: HOSPADM

## 2024-12-06 RX ORDER — ATORVASTATIN CALCIUM 40 MG/1
40 TABLET, FILM COATED ORAL NIGHTLY
Status: DISCONTINUED | OUTPATIENT
Start: 2024-12-06 | End: 2024-12-10 | Stop reason: HOSPADM

## 2024-12-06 RX ORDER — FUROSEMIDE 10 MG/ML
20 INJECTION INTRAMUSCULAR; INTRAVENOUS ONCE
Status: COMPLETED | OUTPATIENT
Start: 2024-12-06 | End: 2024-12-07

## 2024-12-06 RX ORDER — NOREPINEPHRINE BITARTRATE 0.02 MG/ML
INJECTION, SOLUTION INTRAVENOUS
Status: DISCONTINUED | OUTPATIENT
Start: 2024-12-06 | End: 2024-12-09

## 2024-12-06 RX ORDER — IBUPROFEN 200 MG
16 TABLET ORAL
Status: DISCONTINUED | OUTPATIENT
Start: 2024-12-06 | End: 2024-12-07

## 2024-12-06 RX ORDER — MAGNESIUM SULFATE HEPTAHYDRATE 40 MG/ML
2 INJECTION, SOLUTION INTRAVENOUS ONCE
Status: COMPLETED | OUTPATIENT
Start: 2024-12-06 | End: 2024-12-07

## 2024-12-06 RX ORDER — IBUPROFEN 200 MG
24 TABLET ORAL
Status: DISCONTINUED | OUTPATIENT
Start: 2024-12-06 | End: 2024-12-07

## 2024-12-06 RX ORDER — ASPIRIN 81 MG/1
81 TABLET ORAL DAILY
Status: DISCONTINUED | OUTPATIENT
Start: 2024-12-07 | End: 2024-12-10 | Stop reason: HOSPADM

## 2024-12-06 RX ORDER — MUPIROCIN 20 MG/G
OINTMENT TOPICAL 2 TIMES DAILY
Status: DISCONTINUED | OUTPATIENT
Start: 2024-12-06 | End: 2024-12-10 | Stop reason: HOSPADM

## 2024-12-06 RX ORDER — GLUCAGON 1 MG
1 KIT INJECTION
Status: DISCONTINUED | OUTPATIENT
Start: 2024-12-06 | End: 2024-12-07

## 2024-12-06 RX ORDER — POTASSIUM CHLORIDE 20 MEQ/1
40 TABLET, EXTENDED RELEASE ORAL ONCE
Status: DISCONTINUED | OUTPATIENT
Start: 2024-12-06 | End: 2024-12-06

## 2024-12-06 RX ORDER — IODIXANOL 320 MG/ML
INJECTION, SOLUTION INTRAVASCULAR
Status: DISCONTINUED | OUTPATIENT
Start: 2024-12-06 | End: 2024-12-06 | Stop reason: HOSPADM

## 2024-12-06 RX ORDER — POTASSIUM CHLORIDE 7.45 MG/ML
10 INJECTION INTRAVENOUS ONCE
Status: COMPLETED | OUTPATIENT
Start: 2024-12-06 | End: 2024-12-06

## 2024-12-06 RX ORDER — INSULIN ASPART 100 [IU]/ML
0-10 INJECTION, SOLUTION INTRAVENOUS; SUBCUTANEOUS
Status: DISCONTINUED | OUTPATIENT
Start: 2024-12-06 | End: 2024-12-07

## 2024-12-06 RX ORDER — TIROFIBAN HYDROCHLORIDE 50 UG/ML
0.07 INJECTION INTRAVENOUS CONTINUOUS
Status: DISCONTINUED | OUTPATIENT
Start: 2024-12-06 | End: 2024-12-07

## 2024-12-06 RX ORDER — FUROSEMIDE 10 MG/ML
20 INJECTION INTRAMUSCULAR; INTRAVENOUS ONCE
Status: DISCONTINUED | OUTPATIENT
Start: 2024-12-07 | End: 2024-12-07

## 2024-12-06 RX ORDER — POTASSIUM CHLORIDE 20 MEQ/1
40 TABLET, EXTENDED RELEASE ORAL EVERY 4 HOURS
Qty: 4 TABLET | Refills: 0 | Status: SHIPPED | OUTPATIENT
Start: 2024-12-06 | End: 2024-12-07

## 2024-12-06 RX ORDER — HEPARIN SODIUM 10000 [USP'U]/100ML
500 INJECTION, SOLUTION INTRAVENOUS CONTINUOUS
Status: DISCONTINUED | OUTPATIENT
Start: 2024-12-06 | End: 2024-12-07

## 2024-12-06 RX ORDER — SODIUM CHLORIDE 9 MG/ML
INJECTION, SOLUTION INTRAVENOUS CONTINUOUS
Status: DISCONTINUED | OUTPATIENT
Start: 2024-12-06 | End: 2024-12-06

## 2024-12-06 RX ORDER — TIROFIBAN HYDROCHLORIDE 50 UG/ML
INJECTION INTRAVENOUS
Status: DISCONTINUED | OUTPATIENT
Start: 2024-12-06 | End: 2024-12-09

## 2024-12-06 RX ADMIN — INSULIN ASPART 4 UNITS: 100 INJECTION, SOLUTION INTRAVENOUS; SUBCUTANEOUS at 10:12

## 2024-12-06 RX ADMIN — HEPARIN SODIUM 500 UNITS/HR: 10000 INJECTION, SOLUTION INTRAVENOUS at 09:12

## 2024-12-06 RX ADMIN — SODIUM CHLORIDE: 9 INJECTION, SOLUTION INTRAVENOUS at 08:12

## 2024-12-06 RX ADMIN — MUPIROCIN: 20 OINTMENT TOPICAL at 09:12

## 2024-12-06 RX ADMIN — TIROFIBAN 0.07 MCG/KG/MIN: 5 INJECTION, SOLUTION INTRAVENOUS at 08:12

## 2024-12-06 RX ADMIN — POTASSIUM CHLORIDE 10 MEQ: 7.46 INJECTION, SOLUTION INTRAVENOUS at 10:12

## 2024-12-06 RX ADMIN — POTASSIUM CHLORIDE 10 MEQ: 7.46 INJECTION, SOLUTION INTRAVENOUS at 09:12

## 2024-12-06 RX ADMIN — AMIODARONE HYDROCHLORIDE 1 MG/MIN: 1.8 INJECTION, SOLUTION INTRAVENOUS at 07:12

## 2024-12-06 RX ADMIN — AMIODARONE HYDROCHLORIDE 1 MG/MIN: 1.8 INJECTION, SOLUTION INTRAVENOUS at 11:12

## 2024-12-06 NOTE — Clinical Note
The PA catheter was repositioned to the right atrium. Hemodynamics were performed. O2 saturation was measured at 51%.

## 2024-12-06 NOTE — H&P
[facility] Lab ([facility])  Cardiology  History and Physical     Patient Name: Rigoberto Quintana  MRN: [id number]  Admission Date: 12/6/2024  Code Status: No Order   Attending Provider: No att. providers found   Primary Care Physician: JESSICA Miller  Principal Problem:<principal problem not specified>    Patient information was obtained from patient, past medical records, and ER records.     Subjective:     Patient Name: Rigoberto Quintana  MRN: [id number]  Admission Date: 12/6/2024  Code Status: No Order   Attending Provider: No att. providers found   Primary Care Physician: JESSICA Miller  Principal Problem:<principal problem not specified>     Patient information was obtained from patient, past medical records, and ER records.      Subjective:      Chief Complaint:  Chest pain     HPI:  Code STEMI  Transferred from [facility].  Patient with history of type 2 DM, HTN, HLP, and nonobstructive CAD.  Started having chest pain 3 hours ago that has getting significantly worse.  Called EMS.  EKG with anterior ST elevation he went into V-tach and was shocked 1 time was given amiodarone 150.  [facility] EKG with ST elevation in the anterior leads.  He is hemodynamically stable at this time and cath lab team was activated. .  He was loaded with Brilinta and given 4000 units of heparin                Past Medical History:   Diagnosis Date    Diabetes mellitus, type 2      Hyperlipidemia      Hypertension                 Past Surgical History:   Procedure Laterality Date    COLONOSCOPY        rotator cuff repair 4/6/2016 Right      WISDOM TOOTH EXTRACTION             Review of patient's allergies indicates:  No Known Allergies     No current facility-administered medications on file prior to encounter.           Current Outpatient Medications on File Prior to Encounter   Medication Sig    alcohol swabs (ALCOHOL PREP PADS) PadM Test blood sugar 2 times daily and PRN    aspirin 81 MG Chew Take 81  "mg by mouth once daily.    BD ULTRA-FINE SONAL PEN NEEDLES 32 gauge x 5/32" Ndle USE WHICHEVER NEEDLE COMBINES WITH THE VICTOZA PEN PLEASE.( MICRO ).    blood sugar diagnostic Strp Test blood sugar 2 times daily and PRN; DISP: Whichever brand is covered by insurance    blood-glucose meter kit Test blood sugar 2 times daily and PRN; DISP: Whichever brand is covered under insurance    FLUARIX QUAD [id number], PF, 60 mcg (15 mcg x 4)/0.5 mL vaccine TO BE ADMINISTERED BY PHARMACIST FOR IMMUNIZATION    hydrochlorothiazide (HYDRODIURIL) 25 MG tablet TAKE 1 TABLET (25 MG TOTAL) BY MOUTH ONCE DAILY.    JANUMET -1,000 mg TM24 TAKE 1 TABLET BY MOUTH ONCE DAILY.    lancets Misc Test blood sugar 2 times daily and PRN; DISP: whichever brand is covered by insurance    losartan (COZAAR) 50 MG tablet TAKE 1 TABLET (50 MG TOTAL) BY MOUTH ONCE DAILY.    metFORMIN (GLUCOPHAGE-XR) 500 MG ER 24hr tablet Take 2,000 mg by mouth.    naproxen (NAPROSYN) 500 MG tablet Take 1 tablet (500 mg total) by mouth 2 (two) times daily with meals.    OZEMPIC 1 mg/dose (4 mg/3 mL) as directed Subcutaneous    pravastatin (PRAVACHOL) 80 MG tablet TAKE 1 TABLET (80 MG TOTAL) BY MOUTH ONCE DAILY.    VICTOZA 2-DESTIN 0.6 mg/0.1 mL (18 mg/3 mL) PnIj INJECT 0.6 MG INTO THE SKIN ONCE DAILY.      Family History         Problem Relation (Age of Onset)     Depression Father     Heart disease Father     Hyperlipidemia Mother     Hypertension Mother                   Tobacco Use    Smoking status: Never       Passive exposure: Never    Smokeless tobacco: Never   Substance and Sexual Activity    Alcohol use: No    Drug use: No    Sexual activity: Yes       Partners: Female      Review of Systems   Unable to perform ROS: Acuity of condition      Objective:      Vital Signs (Most Recent):  Pulse: 89 (12/06/24 1652)  Resp: (!) 31 (12/06/24 1652)  BP: 138/78 (12/06/24 1652)  SpO2: 98 % (12/06/24 1652) Vital Signs (24h Range):  Pulse:  [88-89] 89  Resp:  [24-31] " "31  SpO2:  [98 %] 98 %  BP: (129-138)/(74-78) 138/78      Weight: 76.2 kg (168 lb)  Body mass index is 27.12 kg/m².     SpO2: 98 %     No intake or output data in the 24 hours ending 12/06/24 1654     Lines/Drains/Airways         Peripheral Intravenous Line  Duration                     Peripheral IV - Single Lumen 12/06/24 1653 18 G Anterior;Distal;Right Forearm <1 day          Peripheral IV - Single Lumen 12/06/24 1654 18 G Left Antecubital <1 day                          Physical Exam  Constitutional:       General: He is in acute distress.            Significant Labs: BMP: No results for input(s): "GLU", "NA", "K", "CL", "CO2", "BUN", "CREATININE", "CALCIUM", "MG" in the last 48 hours., CMP No results for input(s): "NA", "K", "CL", "CO2", "GLU", "BUN", "CREATININE", "CALCIUM", "PROT", "ALBUMIN", "BILITOT", "ALKPHOS", "AST", "ALT", "ANIONGAP", "ESTGFRAFRICA", "EGFRNONAA" in the last 48 hours., Lipid Panel No results for input(s): "CHOL", "HDL", "LDLCALC", "TRIG", "CHOLHDL" in the last 48 hours., and Troponin No results for input(s): "TROPONINI" in the last 48 hours.     Significant Imaging: Echocardiogram: 2D echo with color flow doppler: No results found for this or any previous visit. and Transthoracic echo (TTE) complete (Cupid Only): No results found for this or any previous visit.  Assessment and Plan:      There are no hospital problems to display for this patient.        VTE Risk Mitigation (From admission, onward)              Ordered       heparin (porcine) injection 4,000 Units  ED 1 Time         12/06/24 1653                          Lokesh Quiroz MD  Cardiology     Past Medical History:   Diagnosis Date    Diabetes mellitus, type 2     Hyperlipidemia     Hypertension        Past Surgical History:   Procedure Laterality Date    COLONOSCOPY      rotator cuff repair 4/6/2016 Right     WISDOM TOOTH EXTRACTION         Review of patient's allergies indicates:  No Known Allergies    Current " "Facility-Administered Medications on File Prior to Encounter   Medication    [COMPLETED] furosemide injection 40 mg    [COMPLETED] heparin (porcine) injection 4,000 Units    [COMPLETED] ticagrelor tablet 180 mg    [DISCONTINUED] furosemide (Lasix) 10 mg/mL injection     Current Outpatient Medications on File Prior to Encounter   Medication Sig    alcohol swabs (ALCOHOL PREP PADS) PadM Test blood sugar 2 times daily and PRN    aspirin 81 MG Chew Take 81 mg by mouth once daily.    BD ULTRA-FINE SONAL PEN NEEDLES 32 gauge x 5/32" Ndle USE WHICHEVER NEEDLE COMBINES WITH THE VICTOZA PEN PLEASE.( MICRO ).    blood sugar diagnostic Strp Test blood sugar 2 times daily and PRN; DISP: Whichever brand is covered by insurance    blood-glucose meter kit Test blood sugar 2 times daily and PRN; DISP: Whichever brand is covered under insurance    FLUARIX QUAD [id number], PF, 60 mcg (15 mcg x 4)/0.5 mL vaccine TO BE ADMINISTERED BY PHARMACIST FOR IMMUNIZATION    hydrochlorothiazide (HYDRODIURIL) 25 MG tablet TAKE 1 TABLET (25 MG TOTAL) BY MOUTH ONCE DAILY.    JANUMET -1,000 mg TM24 TAKE 1 TABLET BY MOUTH ONCE DAILY.    lancets Misc Test blood sugar 2 times daily and PRN; DISP: whichever brand is covered by insurance    losartan (COZAAR) 50 MG tablet TAKE 1 TABLET (50 MG TOTAL) BY MOUTH ONCE DAILY.    metFORMIN (GLUCOPHAGE-XR) 500 MG ER 24hr tablet Take 2,000 mg by mouth.    naproxen (NAPROSYN) 500 MG tablet Take 1 tablet (500 mg total) by mouth 2 (two) times daily with meals.    OZEMPIC 1 mg/dose (4 mg/3 mL) as directed Subcutaneous    pravastatin (PRAVACHOL) 80 MG tablet TAKE 1 TABLET (80 MG TOTAL) BY MOUTH ONCE DAILY.    VICTOZA 2-DESTIN 0.6 mg/0.1 mL (18 mg/3 mL) PnIj INJECT 0.6 MG INTO THE SKIN ONCE DAILY.     Family History       Problem Relation (Age of Onset)    Depression Father    Heart disease Father    Hyperlipidemia Mother    Hypertension Mother          Tobacco Use    Smoking status: Never     Passive exposure: Never " "   Smokeless tobacco: Never   Substance and Sexual Activity    Alcohol use: No    Drug use: No    Sexual activity: Yes     Partners: Female     Review of Systems   Unable to perform ROS: Acuity of condition     Objective:     Vital Signs (Most Recent):    Vital Signs (24h Range):  Pulse:  [] 102  Resp:  [19-59] 38  SpO2:  [93 %-98 %] 94 %  BP: (129-158)/(74-87) 158/84        There is no height or weight on file to calculate BMI.            No intake or output data in the 24 hours ending 12/06/24 1739    Lines/Drains/Airways       None                   Physical Exam  Constitutional:       General: He is in acute distress.   Cardiovascular:      Rate and Rhythm: Normal rate.      Heart sounds: No murmur heard.  Pulmonary:      Breath sounds: Rhonchi and rales present.   Abdominal:      General: Abdomen is flat.      Palpations: Abdomen is soft.   Musculoskeletal:      Right lower leg: No edema.      Left lower leg: No edema.   Neurological:      Mental Status: He is oriented to person, place, and time.   Psychiatric:         Behavior: Behavior normal.         Significant Labs: BMP: No results for input(s): "GLU", "NA", "K", "CL", "CO2", "BUN", "CREATININE", "CALCIUM", "MG" in the last 48 hours., CMP No results for input(s): "NA", "K", "CL", "CO2", "GLU", "BUN", "CREATININE", "CALCIUM", "PROT", "ALBUMIN", "BILITOT", "ALKPHOS", "AST", "ALT", "ANIONGAP", "ESTGFRAFRICA", "EGFRNONAA" in the last 48 hours., Lipid Panel No results for input(s): "CHOL", "HDL", "LDLCALC", "TRIG", "CHOLHDL" in the last 48 hours., Troponin   Recent Labs   Lab 12/06/24  1658   TROPONINI 0.149*   , and All pertinent lab results from the last 24 hours have been reviewed.    Significant Imaging: Echocardiogram: 2D echo with color flow doppler: No results found for this or any previous visit. and Transthoracic echo (TTE) complete (Cupid Only): No results found for this or any previous visit.  Assessment and Plan:   69 years old male " with    Acute anterior wall STEMI  Cardiac arrest  Ventricular tachycardia  Hypoxic respiratory failure      Plan  Emergent left heart catheterization  Loaded with aspirin and Brilinta  Heparin  We will start BiPAP  Concern about aspiration.  Low threshold for intubation  For plan post catheterization      Critical care time was 45 minutes. Interviewing the patient, reviewing chart, counseling and coordinating care   There are no hospital problems to display for this patient.      VTE Risk Mitigation (From admission, onward)      None            Lokesh Quiroz MD  Cardiology   [facility] Cath [facility] ([facility])

## 2024-12-06 NOTE — Clinical Note
The catheter was repositioned into the ostium   right coronary artery. An angiography was performed of the right coronary arteries. The angiography was performed via hand injection with 10 mL of contrast.

## 2024-12-06 NOTE — Clinical Note
The PA catheter was repositioned to the main pulmonary artery. Hemodynamics were performed. O2 saturation was measured at 46%.

## 2024-12-07 PROBLEM — I21.3 STEMI (ST ELEVATION MYOCARDIAL INFARCTION): Status: ACTIVE | Noted: 2024-12-07

## 2024-12-07 LAB
ALBUMIN SERPL BCP-MCNC: 3.3 G/DL (ref 3.5–5.2)
ALP SERPL-CCNC: 51 U/L (ref 40–150)
ALT SERPL W/O P-5'-P-CCNC: 83 U/L (ref 10–44)
ANION GAP SERPL CALC-SCNC: 13 MMOL/L (ref 8–16)
APICAL FOUR CHAMBER EJECTION FRACTION: 35 %
APTT PPP: 31 SEC (ref 21–32)
APTT PPP: 36.3 SEC (ref 21–32)
APTT PPP: 47.2 SEC (ref 21–32)
APTT PPP: 90.7 SEC (ref 21–32)
AST SERPL-CCNC: 393 U/L (ref 10–40)
BASOPHILS # BLD AUTO: 0.04 K/UL (ref 0–0.2)
BASOPHILS NFR BLD: 0.2 % (ref 0–1.9)
BILIRUB SERPL-MCNC: 0.2 MG/DL (ref 0.1–1)
BSA FOR ECHO PROCEDURE: 1.9 M2
BUN SERPL-MCNC: 26 MG/DL (ref 8–23)
CALCIUM SERPL-MCNC: 8.8 MG/DL (ref 8.7–10.5)
CHLORIDE SERPL-SCNC: 105 MMOL/L (ref 95–110)
CO2 SERPL-SCNC: 19 MMOL/L (ref 23–29)
CREAT SERPL-MCNC: 1.3 MG/DL (ref 0.5–1.4)
CV ECHO LV RWT: 0.51 CM
DIFFERENTIAL METHOD BLD: ABNORMAL
DOP CALC LVOT AREA: 2.3 CM2
DOP CALC LVOT DIAMETER: 1.7 CM
E WAVE DECELERATION TIME: 148.06 MSEC
E/A RATIO: 1.02
ECHO LV POSTERIOR WALL: 1.1 CM (ref 0.6–1.1)
EOSINOPHIL # BLD AUTO: 0 K/UL (ref 0–0.5)
EOSINOPHIL NFR BLD: 0.1 % (ref 0–8)
ERYTHROCYTE [DISTWIDTH] IN BLOOD BY AUTOMATED COUNT: 15.2 % (ref 11.5–14.5)
EST. GFR  (NO RACE VARIABLE): 59 ML/MIN/1.73 M^2
ESTIMATED AVG GLUCOSE: 163 MG/DL (ref 68–131)
FRACTIONAL SHORTENING: 27.9 % (ref 28–44)
GLUCOSE SERPL-MCNC: 302 MG/DL (ref 70–110)
HBA1C MFR BLD: 7.3 % (ref 4–5.6)
HCT VFR BLD AUTO: 33.9 % (ref 40–54)
HGB BLD-MCNC: 11.2 G/DL (ref 14–18)
IMM GRANULOCYTES # BLD AUTO: 0.09 K/UL (ref 0–0.04)
IMM GRANULOCYTES NFR BLD AUTO: 0.4 % (ref 0–0.5)
INTERVENTRICULAR SEPTUM: 1.1 CM (ref 0.6–1.1)
LACTATE SERPL-SCNC: 2.8 MMOL/L (ref 0.5–2.2)
LEFT ATRIUM AREA SYSTOLIC (APICAL 4 CHAMBER): 19.8 CM2
LEFT INTERNAL DIMENSION IN SYSTOLE: 3.1 CM (ref 2.1–4)
LEFT VENTRICLE DIASTOLIC VOLUME INDEX: 43.51 ML/M2
LEFT VENTRICLE DIASTOLIC VOLUME: 81.37 ML
LEFT VENTRICLE END DIASTOLIC VOLUME APICAL 2 CHAMBER: 26.58 ML
LEFT VENTRICLE END DIASTOLIC VOLUME APICAL 4 CHAMBER: 62.44 ML
LEFT VENTRICLE END SYSTOLIC VOLUME APICAL 4 CHAMBER: 56.7 ML
LEFT VENTRICLE MASS INDEX: 87.1 G/M2
LEFT VENTRICLE SYSTOLIC VOLUME INDEX: 21 ML/M2
LEFT VENTRICLE SYSTOLIC VOLUME: 39.23 ML
LEFT VENTRICULAR INTERNAL DIMENSION IN DIASTOLE: 4.3 CM (ref 3.5–6)
LEFT VENTRICULAR MASS: 162.9 G
LVED V (TEICH): 81.37 ML
LVES V (TEICH): 39.23 ML
LYMPHOCYTES # BLD AUTO: 1.2 K/UL (ref 1–4.8)
LYMPHOCYTES NFR BLD: 5.5 % (ref 18–48)
MAGNESIUM SERPL-MCNC: 2.2 MG/DL (ref 1.6–2.6)
MCH RBC QN AUTO: 27.1 PG (ref 27–31)
MCHC RBC AUTO-ENTMCNC: 33 G/DL (ref 32–36)
MCV RBC AUTO: 82 FL (ref 82–98)
MONOCYTES # BLD AUTO: 1.6 K/UL (ref 0.3–1)
MONOCYTES NFR BLD: 7.5 % (ref 4–15)
MV PEAK A VEL: 0.47 M/S
MV PEAK E VEL: 0.48 M/S
MV STENOSIS PRESSURE HALF TIME: 42.94 MS
MV VALVE AREA P 1/2 METHOD: 5.12 CM2
NEUTROPHILS # BLD AUTO: 18.4 K/UL (ref 1.8–7.7)
NEUTROPHILS NFR BLD: 86.3 % (ref 38–73)
NRBC BLD-RTO: 0 /100 WBC
PISA TR MAX VEL: 1.17 M/S
PLATELET # BLD AUTO: 275 K/UL (ref 150–450)
PMV BLD AUTO: 10.8 FL (ref 9.2–12.9)
POCT GLUCOSE: 154 MG/DL (ref 70–110)
POCT GLUCOSE: 158 MG/DL (ref 70–110)
POCT GLUCOSE: 177 MG/DL (ref 70–110)
POCT GLUCOSE: 241 MG/DL (ref 70–110)
POCT GLUCOSE: 284 MG/DL (ref 70–110)
POCT GLUCOSE: 288 MG/DL (ref 70–110)
POTASSIUM SERPL-SCNC: 4.1 MMOL/L (ref 3.5–5.1)
POTASSIUM SERPL-SCNC: 4.6 MMOL/L (ref 3.5–5.1)
PROT SERPL-MCNC: 6.6 G/DL (ref 6–8.4)
PV MV: 0.77 M/S
PV PEAK GRADIENT: 5 MMHG
PV PEAK VELOCITY: 1.1 M/S
RA VOL SYS: 18.9 ML
RBC # BLD AUTO: 4.13 M/UL (ref 4.6–6.2)
RIGHT ATRIAL AREA: 10 CM2
RIGHT ATRIUM VOLUME AREA LENGTH APICAL 4 CHAMBER: 17.59 ML
SINUS: 2.91 CM
SODIUM SERPL-SCNC: 137 MMOL/L (ref 136–145)
STJ: 2.58 CM
TR MAX PG: 5 MMHG
WBC # BLD AUTO: 21.36 K/UL (ref 3.9–12.7)
Z-SCORE OF LEFT VENTRICULAR DIMENSION IN END DIASTOLE: -1.92
Z-SCORE OF LEFT VENTRICULAR DIMENSION IN END SYSTOLE: -0.29

## 2024-12-07 PROCEDURE — 99900035 HC TECH TIME PER 15 MIN (STAT)

## 2024-12-07 PROCEDURE — 83036 HEMOGLOBIN GLYCOSYLATED A1C: CPT | Performed by: INTERNAL MEDICINE

## 2024-12-07 PROCEDURE — 94761 N-INVAS EAR/PLS OXIMETRY MLT: CPT

## 2024-12-07 PROCEDURE — 85730 THROMBOPLASTIN TIME PARTIAL: CPT | Mod: 91 | Performed by: INTERNAL MEDICINE

## 2024-12-07 PROCEDURE — 87040 BLOOD CULTURE FOR BACTERIA: CPT | Performed by: INTERNAL MEDICINE

## 2024-12-07 PROCEDURE — 25000003 PHARM REV CODE 250: Performed by: INTERNAL MEDICINE

## 2024-12-07 PROCEDURE — 36415 COLL VENOUS BLD VENIPUNCTURE: CPT | Performed by: INTERNAL MEDICINE

## 2024-12-07 PROCEDURE — 63600175 PHARM REV CODE 636 W HCPCS: Performed by: STUDENT IN AN ORGANIZED HEALTH CARE EDUCATION/TRAINING PROGRAM

## 2024-12-07 PROCEDURE — 83605 ASSAY OF LACTIC ACID: CPT | Performed by: INTERNAL MEDICINE

## 2024-12-07 PROCEDURE — 63600175 PHARM REV CODE 636 W HCPCS: Performed by: INTERNAL MEDICINE

## 2024-12-07 PROCEDURE — 84132 ASSAY OF SERUM POTASSIUM: CPT | Performed by: INTERNAL MEDICINE

## 2024-12-07 PROCEDURE — 80053 COMPREHEN METABOLIC PANEL: CPT | Performed by: INTERNAL MEDICINE

## 2024-12-07 PROCEDURE — 20000000 HC ICU ROOM

## 2024-12-07 PROCEDURE — 63600175 PHARM REV CODE 636 W HCPCS

## 2024-12-07 PROCEDURE — 85025 COMPLETE CBC W/AUTO DIFF WBC: CPT | Performed by: INTERNAL MEDICINE

## 2024-12-07 PROCEDURE — 83735 ASSAY OF MAGNESIUM: CPT | Performed by: INTERNAL MEDICINE

## 2024-12-07 PROCEDURE — 85730 THROMBOPLASTIN TIME PARTIAL: CPT | Performed by: INTERNAL MEDICINE

## 2024-12-07 PROCEDURE — 99291 CRITICAL CARE FIRST HOUR: CPT | Mod: 25,,, | Performed by: INTERNAL MEDICINE

## 2024-12-07 RX ORDER — FUROSEMIDE 10 MG/ML
40 INJECTION INTRAMUSCULAR; INTRAVENOUS ONCE
Status: COMPLETED | OUTPATIENT
Start: 2024-12-07 | End: 2024-12-07

## 2024-12-07 RX ORDER — VANCOMYCIN 2 GRAM/500 ML IN 0.9 % SODIUM CHLORIDE INTRAVENOUS
2000 ONCE
Status: COMPLETED | OUTPATIENT
Start: 2024-12-07 | End: 2024-12-07

## 2024-12-07 RX ORDER — GLUCAGON 1 MG
1 KIT INJECTION
Status: DISCONTINUED | OUTPATIENT
Start: 2024-12-07 | End: 2024-12-10 | Stop reason: HOSPADM

## 2024-12-07 RX ORDER — HEPARIN SODIUM,PORCINE/D5W 25000/250
0-40 INTRAVENOUS SOLUTION INTRAVENOUS CONTINUOUS
Status: DISCONTINUED | OUTPATIENT
Start: 2024-12-07 | End: 2024-12-07

## 2024-12-07 RX ORDER — INSULIN ASPART 100 [IU]/ML
0-10 INJECTION, SOLUTION INTRAVENOUS; SUBCUTANEOUS EVERY 6 HOURS PRN
Status: DISCONTINUED | OUTPATIENT
Start: 2024-12-07 | End: 2024-12-07

## 2024-12-07 RX ORDER — FUROSEMIDE 10 MG/ML
20 INJECTION INTRAMUSCULAR; INTRAVENOUS EVERY 12 HOURS
Status: DISCONTINUED | OUTPATIENT
Start: 2024-12-07 | End: 2024-12-08

## 2024-12-07 RX ORDER — HEPARIN SODIUM,PORCINE/D5W 25000/250
0-40 INTRAVENOUS SOLUTION INTRAVENOUS CONTINUOUS
Status: DISCONTINUED | OUTPATIENT
Start: 2024-12-07 | End: 2024-12-08

## 2024-12-07 RX ORDER — FUROSEMIDE 10 MG/ML
20 INJECTION INTRAMUSCULAR; INTRAVENOUS ONCE
Status: COMPLETED | OUTPATIENT
Start: 2024-12-07 | End: 2024-12-07

## 2024-12-07 RX ORDER — INSULIN GLARGINE 100 [IU]/ML
10 INJECTION, SOLUTION SUBCUTANEOUS DAILY
Status: DISCONTINUED | OUTPATIENT
Start: 2024-12-07 | End: 2024-12-08

## 2024-12-07 RX ORDER — INSULIN ASPART 100 [IU]/ML
5 INJECTION, SOLUTION INTRAVENOUS; SUBCUTANEOUS
Status: DISCONTINUED | OUTPATIENT
Start: 2024-12-07 | End: 2024-12-08

## 2024-12-07 RX ORDER — IBUPROFEN 200 MG
16 TABLET ORAL
Status: DISCONTINUED | OUTPATIENT
Start: 2024-12-07 | End: 2024-12-10 | Stop reason: HOSPADM

## 2024-12-07 RX ORDER — INSULIN ASPART 100 [IU]/ML
0-10 INJECTION, SOLUTION INTRAVENOUS; SUBCUTANEOUS
Status: DISCONTINUED | OUTPATIENT
Start: 2024-12-07 | End: 2024-12-10 | Stop reason: HOSPADM

## 2024-12-07 RX ORDER — INSULIN ASPART 100 [IU]/ML
0-10 INJECTION, SOLUTION INTRAVENOUS; SUBCUTANEOUS EVERY 6 HOURS
Status: DISCONTINUED | OUTPATIENT
Start: 2024-12-07 | End: 2024-12-07

## 2024-12-07 RX ORDER — IBUPROFEN 200 MG
24 TABLET ORAL
Status: DISCONTINUED | OUTPATIENT
Start: 2024-12-07 | End: 2024-12-10 | Stop reason: HOSPADM

## 2024-12-07 RX ORDER — FUROSEMIDE 10 MG/ML
40 INJECTION INTRAMUSCULAR; INTRAVENOUS ONCE
Status: DISCONTINUED | OUTPATIENT
Start: 2024-12-07 | End: 2024-12-07

## 2024-12-07 RX ORDER — GLUCAGON 1 MG
1 KIT INJECTION
Status: DISCONTINUED | OUTPATIENT
Start: 2024-12-07 | End: 2024-12-07

## 2024-12-07 RX ADMIN — FUROSEMIDE 20 MG: 10 INJECTION, SOLUTION INTRAMUSCULAR; INTRAVENOUS at 06:12

## 2024-12-07 RX ADMIN — FUROSEMIDE 40 MG: 10 INJECTION, SOLUTION INTRAMUSCULAR; INTRAVENOUS at 11:12

## 2024-12-07 RX ADMIN — VANCOMYCIN HYDROCHLORIDE 2000 MG: 500 INJECTION, POWDER, LYOPHILIZED, FOR SOLUTION INTRAVENOUS at 09:12

## 2024-12-07 RX ADMIN — INSULIN ASPART 6 UNITS: 100 INJECTION, SOLUTION INTRAVENOUS; SUBCUTANEOUS at 06:12

## 2024-12-07 RX ADMIN — INSULIN ASPART 4 UNITS: 100 INJECTION, SOLUTION INTRAVENOUS; SUBCUTANEOUS at 11:12

## 2024-12-07 RX ADMIN — FUROSEMIDE 20 MG: 10 INJECTION, SOLUTION INTRAMUSCULAR; INTRAVENOUS at 08:12

## 2024-12-07 RX ADMIN — INSULIN GLARGINE 10 UNITS: 100 INJECTION, SOLUTION SUBCUTANEOUS at 11:12

## 2024-12-07 RX ADMIN — MUPIROCIN: 20 OINTMENT TOPICAL at 08:12

## 2024-12-07 RX ADMIN — INSULIN ASPART 2 UNITS: 100 INJECTION, SOLUTION INTRAVENOUS; SUBCUTANEOUS at 04:12

## 2024-12-07 RX ADMIN — INSULIN ASPART 5 UNITS: 100 INJECTION, SOLUTION INTRAVENOUS; SUBCUTANEOUS at 04:12

## 2024-12-07 RX ADMIN — INSULIN ASPART 5 UNITS: 100 INJECTION, SOLUTION INTRAVENOUS; SUBCUTANEOUS at 11:12

## 2024-12-07 RX ADMIN — HEPARIN SODIUM 15 UNITS/KG/HR: 10000 INJECTION, SOLUTION INTRAVENOUS at 01:12

## 2024-12-07 RX ADMIN — MAGNESIUM SULFATE HEPTAHYDRATE 2 G: 40 INJECTION, SOLUTION INTRAVENOUS at 12:12

## 2024-12-07 RX ADMIN — AMIODARONE HYDROCHLORIDE 0.5 MG/MIN: 1.8 INJECTION, SOLUTION INTRAVENOUS at 07:12

## 2024-12-07 RX ADMIN — ATORVASTATIN CALCIUM 40 MG: 40 TABLET, FILM COATED ORAL at 08:12

## 2024-12-07 RX ADMIN — FUROSEMIDE 20 MG: 10 INJECTION, SOLUTION INTRAMUSCULAR; INTRAVENOUS at 12:12

## 2024-12-07 RX ADMIN — TICAGRELOR 90 MG: 90 TABLET ORAL at 08:12

## 2024-12-07 RX ADMIN — INSULIN ASPART 1 UNITS: 100 INJECTION, SOLUTION INTRAVENOUS; SUBCUTANEOUS at 10:12

## 2024-12-07 RX ADMIN — ASPIRIN 81 MG: 81 TABLET, COATED ORAL at 08:12

## 2024-12-07 RX ADMIN — HEPARIN SODIUM 12 UNITS/KG/HR: 10000 INJECTION, SOLUTION INTRAVENOUS at 12:12

## 2024-12-07 NOTE — NURSING
Dr Quiroz at bedside. Verbal order to change IABP frequency to 1:2 from 1:1. Patient tolerated well. Verbal order also placed for IVP lasix 20mg. Diet ordered and consult to  for management of hyperglycemia.

## 2024-12-07 NOTE — NURSING
2125 Notified Dr. Qiuroz of critical aPTT >150. Per MD, heparin gtt will be held for 2 hours. Repeat aPTT at 2330. Once aPTT <50 heparin drip may be restarted. No other order given at this time.

## 2024-12-07 NOTE — ASSESSMENT & PLAN NOTE
Patient's FSGs are controlled on current medication regimen.  Last A1c reviewed-   Lab Results   Component Value Date    HGBA1C 7.3 (H) 12/07/2024     Most recent fingerstick glucose reviewed-   Recent Labs   Lab 12/06/24  2210 12/07/24  0011 12/07/24  0605 12/07/24  1143   POCTGLUCOSE 310* 284* 288* 241*     Current correctional scale  Medium  Increase anti-hyperglycemic dose as follows-   Antihyperglycemics (From admission, onward)      Start     Stop Route Frequency Ordered    12/07/24 1130  insulin aspart U-100 pen 5 Units         -- SubQ 3 times daily with meals 12/07/24 0914    12/07/24 1038  insulin aspart U-100 pen 0-10 Units         -- SubQ Before meals & nightly PRN 12/07/24 0939    12/07/24 1015  insulin glargine U-100 (Lantus) pen 10 Units         -- SubQ Daily 12/07/24 0914    12/07/24 1013  insulin aspart U-100 pen 0-10 Units         -- SubQ Before meals & nightly PRN 12/07/24 0914          Hold Oral hypoglycemics while patient is in the hospital.

## 2024-12-07 NOTE — NURSING
Verbal order received from Dr Quiroz to stop Agrastat and Heparin now, APTT at 1200 and IVP Lasix 40mg stat.

## 2024-12-07 NOTE — EICU
Glucose 284<-310<-346    Plan:  Changed aspart moderate dose to every 6 hours with  3units SQ now  HBA1C ordered

## 2024-12-07 NOTE — PLAN OF CARE
Patient is AAOx4, not in any distress. IABP in place, monitored as per policy. VSS. Tolerated diabetic diet. IV Heparin and IV Amio infusion going on, see MAR. Flush bag running in Left femoral venous sheath. Rt femoral  A-line catheter intact. Immobilization promoted, weight shifting encouraged. Fall and safety precautions maintained. Call light within reach.     Problem: Adult Inpatient Plan of Care  Goal: Plan of Care Review  12/7/2024 1703 by Natasha Ramírez RN  Outcome: Progressing     Problem: Intra-Aortic Balloon Pump  Goal: Effective, Safe Device Function  12/7/2024 1703 by Natasha Ramírez RN  Outcome: Progressing     Problem: Intra-Aortic Balloon Pump  Goal: Absence of Infection Signs and Symptoms  12/7/2024 1703 by Natasha Ramírez RN  Outcome: Progressing     Problem: Diabetes  Goal: Blood Glucose Level Within Target Range  12/7/2024 1703 by Natasha Ramírez, RN  Outcome: Progressing    Problem: Gas Exchange Impaired  Goal: Optimal Gas Exchange  12/7/2024 1703 by Natasha Ramírez, RN  Outcome: Progressing    Problem: Fall Injury Risk  Goal: Absence of Fall and Fall-Related Injury  12/7/2024 1703 by Natasha Ramírez, RN  Outcome: Progressing    Problem: Skin Injury Risk Increased  Goal: Skin Health and Integrity  12/7/2024 1703 by Natasha Ramírez RN  Outcome: Progressing

## 2024-12-07 NOTE — PROGRESS NOTES
[facility] - Intensive Care  Cardiology  Progress Note    Patient Name: Rigoberto Quintana  MRN: [id number]  Admission Date: 12/6/2024  Hospital Length of Stay: 1 days  Code Status: Full Code   Attending Physician: Lokesh Quiroz MD   Primary Care Physician: JESSICA Miller [employer]  Expected Discharge Date:   Principal Problem:<principal problem not specified>    Subjective:     Hospital Course: 12/7/2024 seen and examined this morning.  He is doing well.  Stable overnight.  No significant arrhythmias.  Balloon pump one-to-one weaned to 1-2 for 3 hours and he tolerated it well.   Weaned off BiPAP to nasal cannula.  Diuresing well.  Denies any chest pain.  Breathing is better.    POST CATH NOTE     Date of Procedure: 12/6/2024      Procedure: Procedure(s) (LRB):  ANGIOGRAM, CORONARY ARTERY (N/A)  INSERTION, INTRA-AORTIC BALLOON PUMP  PTCA, Single Vessel  INSERTION, STENT, CORONARY ARTERY (N/A)  IVUS, Coronary  INSERTION, CATHETER, RIGHT HEART (Right)      Surgeons and Role:     * Lokesh Quiroz MD - Primary     Assisting Surgeon: None     Pre-Operative Diagnosis: STEMI     Post-Operative Diagnosis: Post-Op Diagnosis Codes:     * STEMI (ST elevation myocardial infarction) [I21.3]        Cath Results:  Access:  Right radial artery 5-6 Bangladeshi sheath  Right common femoral artery 8 Bangladeshi sheath  Left common femoral vein 7 Bangladeshi sheath        LM:  JAME II flow 99% stenosis mid left main reduced to 0% post  LAD: JAME II flow ostial 50% stenosis  LCx:  JAME II flow proximal circ 40-50% stenosis  RCA: JAME III flow  Dominance right     LVgram: LVEDP 17 mmHg     Intervention:   Status post successful emergent PTCA and stenting to left main 99% stenosis with 4.5 x 16 mm RIAN post dilated with 4.5 NC balloon.  IVUS guided with excellent results.  We had to intervene on the left main as the patient was unstable with frequent PVCs and had V-tach and was shocked 3 times by EMS.  The procedure was performed with mechanical  support with intra-aortic balloon pump that was placed before the coronary intervention.  He required Levophed during the procedure that was weaned off.  BiPAP was placed prior to the procedure.     2. Intra-aortic balloon pump placement for mechanical support prior to PCI     3. Right heart catheterization for cardiogenic shock                 Closure device:    Vasc band right radial artery  Right common femoral artery sheath and balloon pump secured in place  Left common femoral vein sheath sutured in place              RHC      Right heart catheterization pressures in mmHg     PCWP:  8    PA:  31   / 5   /16  RV:  37   /    /10  RA:   2     Oxygen saturations in%     PA:  46%  RA:  51%  AO  95%        Cardiac output:  6.3      l/min     Zehra  Cardiac index :   1.75     l/min     On Levophed 0.04           Assessment:          Review of Systems   Eyes:  Negative for blurred vision.   Cardiovascular:  Negative for chest pain.   Respiratory:  Negative for shortness of breath.    Gastrointestinal:  Negative for abdominal pain.     Objective:     Vital Signs (Most Recent):  Temp: 98.2 °F (36.8 °C) (12/07/24 0305)  Pulse: 77 (12/07/24 1045)  Resp: (!) 30 (12/07/24 1045)  BP: 127/73 (12/07/24 1045)  SpO2: 96 % (12/07/24 1045) Vital Signs (24h Range):  Temp:  [98.2 °F (36.8 °C)-98.9 °F (37.2 °C)] 98.2 °F (36.8 °C)  Pulse:  [] 77  Resp:  [19-59] 30  SpO2:  [88 %-100 %] 96 %  BP: ()/(53-99) 127/73     Weight: 77.6 kg (171 lb)  Body mass index is 27.6 kg/m².    SpO2: 96 %         Intake/Output Summary (Last 24 hours) at 12/7/2024 1059  Last data filed at 12/7/2024 1000  Gross per 24 hour   Intake 1170.21 ml   Output 1736 ml   Net -565.79 ml       Lines/Drains/Airways       Drain  Duration                  Urethral Catheter 12/06/24 2040 Double-lumen 16 Fr. <1 day              Line  Duration                  IABP 12/06/24 8.0 Fr. 40 mL 1 day              Peripheral Intravenous Line  Duration                   "Peripheral IV - Single Lumen 12/06/24 1653 18 G Right Forearm <1 day         Peripheral IV - Single Lumen 12/06/24 1654 18 G Left Antecubital <1 day         Peripheral IV - Single Lumen 12/07/24 0858 20 G Anterior;Proximal;Right Forearm <1 day                    Physical Exam  Constitutional:       Appearance: He is ill-appearing.   HENT:      Head: Normocephalic and atraumatic.   Cardiovascular:      Rate and Rhythm: Normal rate and regular rhythm.      Heart sounds: No murmur heard.  Pulmonary:      Breath sounds: Rales present.   Musculoskeletal:      Right lower leg: No edema.      Left lower leg: No edema.   Skin:     General: Skin is warm and dry.   Neurological:      General: No focal deficit present.      Mental Status: He is oriented to person, place, and time.   Psychiatric:         Behavior: Behavior normal.         Significant Labs: BMP:   Recent Labs   Lab 12/06/24 1658 12/06/24  2240 12/07/24  0005 12/07/24  0405   *  --   --  302*     --   --  137   K 3.1*  --  4.6 4.1     --   --  105   CO2 17*  --   --  19*   BUN 20  --   --  26*   CREATININE 1.6*  --   --  1.3   CALCIUM 9.2  --   --  8.8   MG  --  1.6  --  2.2   , CMP   Recent Labs   Lab 12/06/24 1658 12/07/24  0005 12/07/24  0405     --  137   K 3.1* 4.6 4.1     --  105   CO2 17*  --  19*   *  --  302*   BUN 20  --  26*   CREATININE 1.6*  --  1.3   CALCIUM 9.2  --  8.8   PROT 7.2  --  6.6   ALBUMIN 3.4*  --  3.3*   BILITOT 0.2  --  0.2   ALKPHOS 56  --  51   AST 36  --  393*   ALT 39  --  83*   ANIONGAP 19*  --  13   , Lipid Panel No results for input(s): "CHOL", "HDL", "LDLCALC", "TRIG", "CHOLHDL" in the last 48 hours., Troponin   Recent Labs   Lab 12/06/24 1658   TROPONINI 0.149*   , and All pertinent lab results from the last 24 hours have been reviewed.    Significant Imaging: Echocardiogram: 2D echo with color flow doppler: No results found for this or any previous visit. and Transthoracic echo (TTE) " complete (Cupid Only):   Results for orders placed or performed during the hospital encounter of 12/06/24   Echo   Result Value Ref Range    BSA 1.9 m2    A4C EF 35 %    LVIDd 4.3 3.5 - 6.0 cm    LV Systolic Volume 39.23 mL    LV Systolic Volume Index 21.0 mL/m2    LVIDs 3.1 2.1 - 4.0 cm    LV Diastolic Volume 81.37 mL    LV ESV A4C 56.70 mL    LV Diastolic Volume Index 43.51 mL/m2    LV EDV A2C [id number] mL    LV EDV A4C 62.44 mL    Left Ventricular End Systolic Volume by Teichholz Method 39.23 mL    Left Ventricular End Diastolic Volume by Teichholz Method 81.37 mL    IVS 1.1 0.6 - 1.1 cm    LVOT diameter 1.7 cm    LVOT area 2.3 cm2    FS 27.9 (A) 28 - 44 %    Left Ventricle Relative Wall Thickness 0.51 cm    PW 1.1 0.6 - 1.1 cm    LV mass 162.9 g    LV Mass Index 87.1 g/m2    MV Peak E Hima 0.48 m/s    MV Peak A Hima 0.47 m/s    TR Max Hima 1.17 m/s    E/A ratio 1.02     E wave deceleration time 148.06 msec    RA area length vol 17.59 mL    RA Area 10.0 cm2    RA Vol 18.90 mL    MV stenosis pressure 1/2 time 42.94 ms    MV valve area p 1/2 method 5.12 cm2    Triscuspid Valve Regurgitation Peak Gradient 5 mmHg    PV PEAK VELOCITY 1.10 m/s    PV peak gradient 5 mmHg    Pulmonary Valve Mean Velocity 0.77 m/s    Sinus 2.91 cm    STJ 2.58 cm    ZLVIDS -0.29     ZLVIDD -1.92     LA area A4C 19.80 cm2     Assessment and Plan:   69 years old male presented with chest pain, cardiac arrest ( VT) post 3 shocks, EKG by EMS anterior STEMI.  Left heart catheterization with 99% occluded left main post emergent left main intervention.       Brief HPI:   1. Acute anterior wall STEMI the culprit was left main 99%.  Post emergent intervention to left main with 1 RIAN.      2. Cardiogenic shock   3. Ventricular tachycardia  4. Hypokalemia resolved  5. Hyperglycemia  6. Acute kidney injury resolved  7. Leukocytosis     Plan:   Status post successful intervention of the above  Aspirin and Brilinta  We will stop Aggrastat  Balloon  pump weaned to 1:2 and he tolerated well.   Repeat chest x-ray with persistent pulmonary edema.  Given additional Lasix 40 mg  We will start Lasix 20 mg IV b.i.d.  We will assess for moving intra-aortic balloon pump today or tomorrow  Hold heparin drip.  Keep IABP at 1:1  Strict I's and O's  High-intensity statin  2D echocardiogram prelim EF 40-45%.  Suboptimal windows.  We will repeat full echo again before discharge after intra-aortic balloon pump removed  Sliding scale insulin  Hospital Medicine team consulted for diabetes management  Leukocytosis most likely stress related.  We will check blood culture.  We will give vancomycin empirically.       Had a long discussion with the patient and his family.  Spoke with his daughter over the phone who is coming from [place].  She is a physician.  All their questions answered         Critical care time was 45 minutes. Interviewing the patient, reviewing chart, counseling and coordinating care       There are no hospital problems to display for this patient.      VTE Risk Mitigation (From admission, onward)      None            Lokesh Quiroz MD  Cardiology  [facility] - Intensive Care

## 2024-12-07 NOTE — CONSULTS
[place] - Intensive Care  [facility] Medicine  Consult Note    Patient Name: Rigoberto Quintana  MRN: [id number]  Admission Date: 12/6/2024  Hospital Length of Stay: 1 days  Attending Physician: Daniel Gusman MD  Primary Care Provider: JESSICA Miller [facility]           Patient information was obtained from patient and ER records.     Inpatient consult to [facility]  Consult performed by: Daniel Decker MD  Consult ordered by: Lokesh Quiroz MD        Subjective:     Principal Problem: STEMI (ST elevation myocardial infarction)    Chief Complaint:   Chief Complaint   Patient presents with    Chest Pain     Received awake, alert from [facility] for STEMI. Cath lab ready - patient transported straight to cath lab.        HPI: 69 years old male with past medical history of type 2 diabetes hypertension hyperlipidemia history of CAD, presented to the hospital with chest pain patient was found to have anterior wall STEMI, patient went into V-tach and got shocked and was started on amiodarone load, patient had acute hypoxic respiratory failure on initial presentation was placed on BiPAP then weaned off into nasal cannula patient also was hyperglycemic, patient was taken to cardiac catheterization urgently and was found to have anterior wall STEMI, with left main  99% occlusion status post RIAN, patient had balloon pump in place was successfully being weaned off.  Internal Medicine team was consulted for type 2 diabetes management.    Past Medical History:   Diagnosis Date    Diabetes mellitus, type 2     Hyperlipidemia     Hypertension        Past Surgical History:   Procedure Laterality Date    COLONOSCOPY      rotator cuff repair 4/6/2016 Right     WISDOM TOOTH EXTRACTION         Review of patient's allergies indicates:  No Known Allergies    Current Facility-Administered Medications on File Prior to Encounter   Medication    [COMPLETED] furosemide injection 40 mg    [COMPLETED] heparin  "(porcine) injection 4,000 Units    [COMPLETED] ticagrelor tablet 180 mg    [DISCONTINUED] furosemide (Lasix) 10 mg/mL injection     Current Outpatient Medications on File Prior to Encounter   Medication Sig    aspirin 81 MG Chew Take 81 mg by mouth once daily.    hydrochlorothiazide (HYDRODIURIL) 25 MG tablet TAKE 1 TABLET (25 MG TOTAL) BY MOUTH ONCE DAILY.    JANUMET -1,000 mg TM24 TAKE 1 TABLET BY MOUTH ONCE DAILY.    lancets Misc Test blood sugar 2 times daily and PRN; DISP: whichever brand is covered by insurance    losartan (COZAAR) 50 MG tablet TAKE 1 TABLET (50 MG TOTAL) BY MOUTH ONCE DAILY.    metFORMIN (GLUCOPHAGE-XR) 500 MG ER 24hr tablet Take 2,000 mg by mouth.    OZEMPIC 1 mg/dose (4 mg/3 mL) as directed Subcutaneous    pravastatin (PRAVACHOL) 80 MG tablet TAKE 1 TABLET (80 MG TOTAL) BY MOUTH ONCE DAILY.    alcohol swabs (ALCOHOL PREP PADS) PadM Test blood sugar 2 times daily and PRN    BD ULTRA-FINE SONAL PEN NEEDLES 32 gauge x 5/32" Ndle USE WHICHEVER NEEDLE COMBINES WITH THE VICTOZA PEN PLEASE.( MICRO ).    blood sugar diagnostic Strp Test blood sugar 2 times daily and PRN; DISP: Whichever brand is covered by insurance    blood-glucose meter kit Test blood sugar 2 times daily and PRN; DISP: Whichever brand is covered under insurance    FLUARIX QUAD 4610-8726, PF, 60 mcg (15 mcg x 4)/0.5 mL vaccine TO BE ADMINISTERED BY PHARMACIST FOR IMMUNIZATION    naproxen (NAPROSYN) 500 MG tablet Take 1 tablet (500 mg total) by mouth 2 (two) times daily with meals.    VICTOZA 2-DESTIN 0.6 mg/0.1 mL (18 mg/3 mL) PnIj INJECT 0.6 MG INTO THE SKIN ONCE DAILY.     Family History       Problem Relation (Age of Onset)    Depression Father    Heart disease Father    Hyperlipidemia Mother    Hypertension Mother          Tobacco Use    Smoking status: Never     Passive exposure: Never    Smokeless tobacco: Never   Substance and Sexual Activity    Alcohol use: No    Drug use: No    Sexual activity: Yes     Partners: Female "     Review of Systems   Constitutional:  Positive for activity change. Negative for appetite change, chills, diaphoresis, fatigue, fever and unexpected weight change.   HENT: Negative.     Eyes: Negative.    Respiratory:  Negative for apnea, cough, choking, chest tightness, shortness of breath, wheezing and stridor.    Cardiovascular:  Negative for chest pain, palpitations and leg swelling.   Gastrointestinal: Negative.    Endocrine: Negative.    Genitourinary: Negative.    Musculoskeletal: Negative.    Neurological: Negative.    Psychiatric/Behavioral: Negative.       Objective:     Vital Signs (Most Recent):  Temp: 98.8 °F (37.1 °C) (12/07/24 1110)  Pulse: 97 (12/07/24 1130)  Resp: (!) 21 (12/07/24 1130)  BP: 126/86 (12/07/24 1130)  SpO2: 95 % (12/07/24 1130) Vital Signs (24h Range):  Temp:  [98.2 °F (36.8 °C)-98.9 °F (37.2 °C)] 98.8 °F (37.1 °C)  Pulse:  [] 97  Resp:  [19-59] 21  SpO2:  [88 %-100 %] 95 %  BP: ()/(53-99) 126/86     Weight: 77.6 kg (171 lb)  Body mass index is 27.6 kg/m².     Physical Exam  Constitutional:       Appearance: Normal appearance.   HENT:      Head: Normocephalic and atraumatic.      Nose: Nose normal.   Eyes:      Extraocular Movements: Extraocular movements intact.      Pupils: Pupils are equal, round, and reactive to light.   Cardiovascular:      Rate and Rhythm: Normal rate and regular rhythm.      Comments:  Balloon pump  Pulmonary:      Effort: Pulmonary effort is normal.      Breath sounds: Normal breath sounds.   Abdominal:      General: Abdomen is flat.      Palpations: Abdomen is soft.   Musculoskeletal:         General: Normal range of motion.      Cervical back: Normal range of motion.   Skin:     General: Skin is warm.      Capillary Refill: Capillary refill takes less than 2 seconds.   Neurological:      Mental Status: He is alert.   Psychiatric:         Mood and Affect: Mood normal.          Significant Labs: All pertinent labs within the past 24 hours have  been reviewed.  Recent Lab Results  (Last 5 results in the past 24 hours)        12/07/24  0826   12/07/24  0726   12/07/24  0605   12/07/24  0405   12/07/24  0011        Albumin       3.3         ALP       51         ALT       83         Anion Gap       13         PTT   36.3  Comment: Refer to local heparin nomogram for intensity/dose specific   therapeutic   range.  LOT^050^APTT [id number]               AST       393         Baso #       0.04         Basophil %       0.2         BILIRUBIN TOTAL       0.2  Comment: For infants and newborns, interpretation of results should be based  on gestational age, weight and in agreement with clinical  observations.    Premature Infant recommended reference ranges:  Up to 24 hours.............<8.0 mg/dL  Up to 48 hours............<12.0 mg/dL  3-5 days..................<15.0 mg/dL  6-29 days.................<15.0 mg/dL           BUN       26         Calcium       8.8         Chloride       105         CO2       19         Creatinine       1.3         Differential Method       Automated         eGFR       59         Eos #       0.0         Eos %       0.1         Estimated Avg Glucose       163         Glucose       302         Gran # (ANC)       18.4         Gran %       86.3         Hematocrit       33.9         Hemoglobin       11.2         Hemoglobin A1C External       7.3  Comment: ADA Screening Guidelines:  5.7-6.4%  Consistent with prediabetes  >or=6.5%  Consistent with diabetes    High levels of fetal hemoglobin interfere with the HbA1C  assay. Heterozygous hemoglobin variants (HbS, HgC, etc)do  not significantly interfere with this assay.   However, presence of multiple variants may affect accuracy.           Immature Grans (Abs)       0.09  Comment: Mild elevation in immature granulocytes is non specific and   can be seen in a variety of conditions including stress response,   acute inflammation, trauma and pregnancy. Correlation with other   laboratory and clinical  findings is essential.           Immature Granulocytes       0.4         Lactic Acid Level 2.8  Comment: Falsely low lactic acid results can be found in samples   containing >=13.0 mg/dL total bilirubin and/or >=3.5 mg/dL   direct bilirubin.                 Lymph #       1.2         Lymph %       5.5         Magnesium        2.2         MCH       27.1         MCHC       33.0         MCV       82         Mono #       1.6         Mono %       7.5         MPV       10.8         nRBC       0         Platelet Count       275         POCT Glucose     288     284       Potassium       4.1         PROTEIN TOTAL       6.6         RBC       4.13         RDW       15.2         Sodium       137         WBC       21.36                                Significant Imaging: I have reviewed all pertinent imaging results/findings within the past 24 hours.  Assessment/Plan:     * STEMI (ST elevation myocardial infarction)  Cardiology is a primary team  Status post left main RIAN, weaning of bone-on-bone  -weaned off BiPAP and to nasal cannula now into room air doing well  -managed by cardiology    Diabetes mellitus due to underlying condition without complications  Patient's FSGs are controlled on current medication regimen.  Last A1c reviewed-   Lab Results   Component Value Date    HGBA1C 7.3 (H) 12/07/2024     Most recent fingerstick glucose reviewed-   Recent Labs   Lab 12/06/24  2210 12/07/24  0011 12/07/24  0605 12/07/24  1143   POCTGLUCOSE 310* 284* 288* 241*     Current correctional scale  Medium  Increase anti-hyperglycemic dose as follows-   Antihyperglycemics (From admission, onward)      Start     Stop Route Frequency Ordered    12/07/24 1130  insulin aspart U-100 pen 5 Units         -- SubQ 3 times daily with meals 12/07/24 0914    12/07/24 1038  insulin aspart U-100 pen 0-10 Units         -- SubQ Before meals & nightly PRN 12/07/24 0939    12/07/24 1015  insulin glargine U-100 (Lantus) pen 10 Units         -- SubQ Daily  12/07/24 0914    12/07/24 1013  insulin aspart U-100 pen 0-10 Units         -- SubQ Before meals & nightly PRN 12/07/24 0914          Hold Oral hypoglycemics while patient is in the hospital.    Benign essential HTN  Patient's blood pressure range in the last 24 hours was: BP  Min: 94/55  Max: 158/84.The patient's inpatient anti-hypertensive regimen is listed below:  Current Antihypertensives  furosemide injection 20 mg, Every 12 hours, Intravenous    Plan  - BP is controlled, no changes needed to their regimen  - controlled by Cardiology team    Hyperlipidemia with target LDL less than 130    High-intensity statin  -recommend ordering lipid panel since last one  was from 2017      VTE Risk Mitigation (From admission, onward)      None            Critical care time spent on the evaluation and treatment of severe organ dysfunction, review of pertinent labs and imaging studies, discussions with consulting providers and discussions with patient/family: 45 minutes.    Thank you for your consult. I will follow-up with patient. Please contact us if you have any additional questions.    Daniel Gusman MD  Department of Hospital Medicine   [place] - Intensive Care

## 2024-12-07 NOTE — HPI
69 years old male with past medical history of type 2 diabetes hypertension hyperlipidemia history of CAD, presented to the hospital with chest pain patient was found to have anterior wall STEMI, patient went into V-tach and got shocked and was started on amiodarone load, patient had acute hypoxic respiratory failure on initial presentation was placed on BiPAP then weaned off into nasal cannula patient also was hyperglycemic, patient was taken to cardiac catheterization urgently and was found to have anterior wall STEMI, with left main  99% occlusion status post RIAN, patient had balloon pump in place was successfully being weaned off.  Internal Medicine team was consulted for type 2 diabetes management.

## 2024-12-07 NOTE — BRIEF OP NOTE
[place] - Cath Lab ([facility])  Surgery Department  Operative Note    SUMMARY   POST CATH NOTE    Date of Procedure: 12/6/2024     Procedure: Procedure(s) (LRB):  ANGIOGRAM, CORONARY ARTERY (N/A)  INSERTION, INTRA-AORTIC BALLOON PUMP  PTCA, Single Vessel  INSERTION, STENT, CORONARY ARTERY (N/A)  IVUS, Coronary  INSERTION, CATHETER, RIGHT HEART (Right)     Surgeons and Role:     * Lokesh Quiroz MD - Primary    Assisting Surgeon: None    Pre-Operative Diagnosis: STEMI    Post-Operative Diagnosis: Post-Op Diagnosis Codes:     * STEMI (ST elevation myocardial infarction) [I21.3]      Cath Results:  Access:  Right radial artery 5-6 Singaporean sheath  Right common femoral artery 8 Singaporean sheath  Left common femoral vein 7 Singaporean sheath      LM:  JAME II flow 99% stenosis mid left main reduced to 0% post  LAD: JAME II flow ostial 50% stenosis  LCx:  JAME II flow proximal circ 40-50% stenosis  RCA: JAME III flow  Dominance right    LVgram: LVEDP 17 mmHg    Intervention:   Status post successful emergent PTCA and stenting to left main 99% stenosis with 4.5 x 16 mm RIAN post dilated with 4.5 NC balloon.  IVUS guided with excellent results.  We had to intervene on the left main as the patient was unstable with frequent PVCs and had V-tach and was shocked 3 times by EMS.  The procedure was performed with mechanical support with intra-aortic balloon pump that was placed before the coronary intervention.  He required Levophed during the procedure that was weaned off.  BiPAP was placed prior to the procedure.    2. Intra-aortic balloon pump placement for mechanical support prior to PCI    3. Right heart catheterization for cardiogenic shock             Closure device:    Vasc band right radial artery  Right common femoral artery sheath and balloon pump secured in place  Left common femoral vein sheath sutured in place          RHC     Right heart catheterization pressures in mmHg    PCWP:  8    PA:  31   / 5   /16  RV:  37   /     /10  RA:   2    Oxygen saturations in%    PA:  46%  RA:  51%  AO  95%      Cardiac output:  6.3      l/min     Zehra  Cardiac index :   1.75     l/min    On Levophed 0.04        Assessment:   1. Acute anterior wall STEMI the culprit was left main 99%.  Post emergent intervention to left main with 1 RIAN.  Staging done with mechanical support with intra-aortic balloon pump    2. Cardiogenic shock   3. Ventricular tachycardia  4. Hypokalemia  5. Hyperglycemia  6. Acute kidney injury  7. Leukocytosis     Plan:   Status post successful intervention of the above  Aspirin and Brilinta  Aggrastat for 18 hours  Heparin drip at fixed dose 500 units per hour for the balloon pump  Check chest x-ray  Continue amiodarone  Otero catheter.  Strict I's and O's  High-intensity statin  2D echocardiogram  Replace potassium  Sliding scale insulin  Consult EICU for hyperglycemia management

## 2024-12-07 NOTE — ASSESSMENT & PLAN NOTE
Patient's blood pressure range in the last 24 hours was: BP  Min: 94/55  Max: 158/84.The patient's inpatient anti-hypertensive regimen is listed below:  Current Antihypertensives  furosemide injection 20 mg, Every 12 hours, Intravenous    Plan  - BP is controlled, no changes needed to their regimen  - controlled by Cardiology team   Pt BIBA c/o back spasms.  Pt arrive with 18g in LAC.  Pt received 100mcg fentany prior to arrival. Hx herniated discs.

## 2024-12-07 NOTE — PLAN OF CARE
Pt AAOx4. Pt denies any chest pain or SOB throughout the shift. IV Lasix given. SR on monitor. BIPAP removed around 11pm, currently on 3.5L on NC, O2 sat >94%. E+ replaced overnight. No need for replacements at this time.  IABP currently 1:1. R groin with small saturated area marked. MD aware. +2 PP. Cap refill <3. Pt reminded to keep right leg straight, verbalized understanding. Amio, aggatraban, and heparin gtt infusing. Next pTT scheduled @ 7am. U/O about 1L for the shift. Bed locked at lowest position, call light within reach. Wife at bedside.

## 2024-12-07 NOTE — NURSING
Verbal order from Dr Quiroz to change mode from 1:2 to 1:1. Stated will be at the bedside to pull out IABP.

## 2024-12-07 NOTE — SUBJECTIVE & OBJECTIVE
"Past Medical History:   Diagnosis Date    Diabetes mellitus, type 2     Hyperlipidemia     Hypertension        Past Surgical History:   Procedure Laterality Date    COLONOSCOPY      rotator cuff repair 4/6/2016 Right     WISDOM TOOTH EXTRACTION         Review of patient's allergies indicates:  No Known Allergies    Current Facility-Administered Medications on File Prior to Encounter   Medication    [COMPLETED] furosemide injection 40 mg    [COMPLETED] heparin (porcine) injection 4,000 Units    [COMPLETED] ticagrelor tablet 180 mg    [DISCONTINUED] furosemide (Lasix) 10 mg/mL injection     Current Outpatient Medications on File Prior to Encounter   Medication Sig    aspirin 81 MG Chew Take 81 mg by mouth once daily.    hydrochlorothiazide (HYDRODIURIL) 25 MG tablet TAKE 1 TABLET (25 MG TOTAL) BY MOUTH ONCE DAILY.    JANUMET -1,000 mg TM24 TAKE 1 TABLET BY MOUTH ONCE DAILY.    lancets Misc Test blood sugar 2 times daily and PRN; DISP: whichever brand is covered by insurance    losartan (COZAAR) 50 MG tablet TAKE 1 TABLET (50 MG TOTAL) BY MOUTH ONCE DAILY.    metFORMIN (GLUCOPHAGE-XR) 500 MG ER 24hr tablet Take 2,000 mg by mouth.    OZEMPIC 1 mg/dose (4 mg/3 mL) as directed Subcutaneous    pravastatin (PRAVACHOL) 80 MG tablet TAKE 1 TABLET (80 MG TOTAL) BY MOUTH ONCE DAILY.    alcohol swabs (ALCOHOL PREP PADS) PadM Test blood sugar 2 times daily and PRN    BD ULTRA-FINE SONAL PEN NEEDLES 32 gauge x 5/32" Ndle USE WHICHEVER NEEDLE COMBINES WITH THE VICTOZA PEN PLEASE.( MICRO ).    blood sugar diagnostic Strp Test blood sugar 2 times daily and PRN; DISP: Whichever brand is covered by insurance    blood-glucose meter kit Test blood sugar 2 times daily and PRN; DISP: Whichever brand is covered under insurance    FLUARIX QUAD 4888-5822, PF, 60 mcg (15 mcg x 4)/0.5 mL vaccine TO BE ADMINISTERED BY PHARMACIST FOR IMMUNIZATION    naproxen (NAPROSYN) 500 MG tablet Take 1 tablet (500 mg total) by mouth 2 (two) times daily " with meals.    VICTOZA 2-DESTIN 0.6 mg/0.1 mL (18 mg/3 mL) PnIj INJECT 0.6 MG INTO THE SKIN ONCE DAILY.     Family History       Problem Relation (Age of Onset)    Depression Father    Heart disease Father    Hyperlipidemia Mother    Hypertension Mother          Tobacco Use    Smoking status: Never     Passive exposure: Never    Smokeless tobacco: Never   Substance and Sexual Activity    Alcohol use: No    Drug use: No    Sexual activity: Yes     Partners: Female     Review of Systems   Constitutional:  Positive for activity change. Negative for appetite change, chills, diaphoresis, fatigue, fever and unexpected weight change.   HENT: Negative.     Eyes: Negative.    Respiratory:  Negative for apnea, cough, choking, chest tightness, shortness of breath, wheezing and stridor.    Cardiovascular:  Negative for chest pain, palpitations and leg swelling.   Gastrointestinal: Negative.    Endocrine: Negative.    Genitourinary: Negative.    Musculoskeletal: Negative.    Neurological: Negative.    Psychiatric/Behavioral: Negative.       Objective:     Vital Signs (Most Recent):  Temp: 98.8 °F (37.1 °C) (12/07/24 1110)  Pulse: 97 (12/07/24 1130)  Resp: (!) 21 (12/07/24 1130)  BP: 126/86 (12/07/24 1130)  SpO2: 95 % (12/07/24 1130) Vital Signs (24h Range):  Temp:  [98.2 °F (36.8 °C)-98.9 °F (37.2 °C)] 98.8 °F (37.1 °C)  Pulse:  [] 97  Resp:  [19-59] 21  SpO2:  [88 %-100 %] 95 %  BP: ()/(53-99) 126/86     Weight: 77.6 kg (171 lb)  Body mass index is 27.6 kg/m².     Physical Exam  Constitutional:       Appearance: Normal appearance.   HENT:      Head: Normocephalic and atraumatic.      Nose: Nose normal.   Eyes:      Extraocular Movements: Extraocular movements intact.      Pupils: Pupils are equal, round, and reactive to light.   Cardiovascular:      Rate and Rhythm: Normal rate and regular rhythm.      Comments:  Balloon pump  Pulmonary:      Effort: Pulmonary effort is normal.      Breath sounds: Normal breath sounds.    Abdominal:      General: Abdomen is flat.      Palpations: Abdomen is soft.   Musculoskeletal:         General: Normal range of motion.      Cervical back: Normal range of motion.   Skin:     General: Skin is warm.      Capillary Refill: Capillary refill takes less than 2 seconds.   Neurological:      Mental Status: He is alert.   Psychiatric:         Mood and Affect: Mood normal.          Significant Labs: All pertinent labs within the past 24 hours have been reviewed.  Recent Lab Results  (Last 5 results in the past 24 hours)        12/07/24  0826   12/07/24  0726   12/07/24  0605   12/07/24  0405   12/07/24  0011        Albumin       3.3         ALP       51         ALT       83         Anion Gap       13         PTT   36.3  Comment: Refer to local heparin nomogram for intensity/dose specific   therapeutic   range.  LOT^050^APTT [id number]               AST       393         Baso #       0.04         Basophil %       0.2         BILIRUBIN TOTAL       0.2  Comment: For infants and newborns, interpretation of results should be based  on gestational age, weight and in agreement with clinical  observations.    Premature Infant recommended reference ranges:  Up to 24 hours.............<8.0 mg/dL  Up to 48 hours............<12.0 mg/dL  3-5 days..................<15.0 mg/dL  6-29 days.................<15.0 mg/dL           BUN       26         Calcium       8.8         Chloride       105         CO2       19         Creatinine       1.3         Differential Method       Automated         eGFR       59         Eos #       0.0         Eos %       0.1         Estimated Avg Glucose       163         Glucose       302         Gran # (ANC)       18.4         Gran %       86.3         Hematocrit       33.9         Hemoglobin       11.2         Hemoglobin A1C External       7.3  Comment: ADA Screening Guidelines:  5.7-6.4%  Consistent with prediabetes  >or=6.5%  Consistent with diabetes    High levels of fetal hemoglobin  interfere with the HbA1C  assay. Heterozygous hemoglobin variants (HbS, HgC, etc)do  not significantly interfere with this assay.   However, presence of multiple variants may affect accuracy.           Immature Grans (Abs)       0.09  Comment: Mild elevation in immature granulocytes is non specific and   can be seen in a variety of conditions including stress response,   acute inflammation, trauma and pregnancy. Correlation with other   laboratory and clinical findings is essential.           Immature Granulocytes       0.4         Lactic Acid Level 2.8  Comment: Falsely low lactic acid results can be found in samples   containing >=13.0 mg/dL total bilirubin and/or >=3.5 mg/dL   direct bilirubin.                 Lymph #       1.2         Lymph %       5.5         Magnesium        2.2         MCH       27.1         MCHC       33.0         MCV       82         Mono #       1.6         Mono %       7.5         MPV       10.8         nRBC       0         Platelet Count       275         POCT Glucose     288     284       Potassium       4.1         PROTEIN TOTAL       6.6         RBC       4.13         RDW       15.2         Sodium       137         WBC       21.36                                Significant Imaging: I have reviewed all pertinent imaging results/findings within the past 24 hours.

## 2024-12-07 NOTE — NURSING
R radial vascband removed, puncture site noted no oozing or hematoma. +2 pulses, tegaderm applied.

## 2024-12-07 NOTE — EICU
69 year old male admitted with STEMI S/P LAD PTCA and IABP.On heparin infusion,tirofiban and amiodarone.    APTT >150  Potassium 3.1  Glucose 496->346    Plan:  Heparin infusion to be held for 2 hours as per cardiology and restarted at lower rate and recheck APTT after 6 hours  Potassium chloride 10 meq IVPB over 1 hours x2 and recheck potassium  Magnesium level   Aspart 4 units SQ as per moderate dose scale ACHS  Recheck glucose at midnight and if >200 mg/dl with increase frequency of coverage.

## 2024-12-07 NOTE — PROGRESS NOTES
Pharmacokinetic Initial Assessment: IV Vancomycin    Assessment/Plan:    Initiate intravenous vancomycin with loading dose of 2000 mg once followed by a maintenance dose of vancomycin 1500 mg IV every 24 hours  Desired empiric serum trough concentration is 15 to 20 mcg/mL  Draw vancomycin trough level 60 min prior to third dose on 12/9 at approximately 0800  Pharmacy will continue to follow and monitor vancomycin.      Please contact pharmacy at extension [phone] with any questions regarding this assessment.     Thank you for the consult,   Cathie Valencia       Patient brief summary:  Rigoberto Quintana is a 69 y.o. male initiated on antimicrobial therapy with IV Vancomycin for treatment of suspected sepsis    Drug Allergies:   Review of patient's allergies indicates:  No Known Allergies    Actual Body Weight:   78 kg    Renal Function:   Estimated Creatinine Clearance: 51.2 mL/min (based on SCr of 1.3 mg/dL).,     CBC (last 72 hours):  Recent Labs   Lab Result Units 12/06/24  1658 12/07/24  0405   WBC K/uL 19.33* 21.36*   Hemoglobin g/dL 12.7* 11.2*   Hemoglobin A1C %  --  7.3*   Hematocrit % 42.1 33.9*   Platelets K/uL 353 275   Gran % % 58.0 86.3*   Lymph % % 34.6 5.5*   Mono % % 6.2 7.5   Eosinophil % % 0.5 0.1   Basophil % % 0.7 0.2   Differential Method  Automated Automated       Metabolic Panel (last 72 hours):  Recent Labs   Lab Result Units 12/06/24  1658 12/06/24  2240 12/07/24  0005 12/07/24  0405   Sodium mmol/L 140  --   --  137   Potassium mmol/L 3.1*  --  4.6 4.1   Chloride mmol/L 104  --   --  105   CO2 mmol/L 17*  --   --  19*   Glucose mg/dL 496*  --   --  302*   BUN mg/dL 20  --   --  26*   Creatinine mg/dL 1.6*  --   --  1.3   Albumin g/dL 3.4*  --   --  3.3*   Total Bilirubin mg/dL 0.2  --   --  0.2   Alkaline Phosphatase U/L 56  --   --  51   AST U/L 36  --   --  393*   ALT U/L 39  --   --  83*   Magnesium mg/dL  --  1.6  --  2.2       Drug levels (last 3 results):  No results for input(s):  ""VANCOMYCINRA", "VANCORANDOM", "VANCOMYCINPE", "VANCOPEAK", "VANCOMYCINTR", "VANCOTROUGH" in the last 72 hours.    Microbiologic Results:  Microbiology Results (last 7 days)       Procedure Component Value Units Date/Time    Blood culture [[id number]]     Order Status: No result Specimen: Blood             "

## 2024-12-07 NOTE — ASSESSMENT & PLAN NOTE
Cardiology is a primary team  Status post left main RIAN, weaning of bone-on-bone  -weaned off BiPAP and to nasal cannula now into room air doing well  -managed by cardiology

## 2024-12-07 NOTE — NURSING
Notified Dr. Quiroz of Mg level: 1.6. Telephone orders given to administer 2 g of Mg IV once.  PRN orders given to administer one time dose of 40 mEq of potassium for a K < 3.5. No other orders given. Will continue to monitor.

## 2024-12-08 LAB
ALBUMIN SERPL BCP-MCNC: 3.1 G/DL (ref 3.5–5.2)
ALBUMIN SERPL BCP-MCNC: 3.1 G/DL (ref 3.5–5.2)
ALP SERPL-CCNC: 46 U/L (ref 40–150)
ALP SERPL-CCNC: 47 U/L (ref 40–150)
ALT SERPL W/O P-5'-P-CCNC: 51 U/L (ref 10–44)
ALT SERPL W/O P-5'-P-CCNC: 60 U/L (ref 10–44)
ANION GAP SERPL CALC-SCNC: 10 MMOL/L (ref 8–16)
ANION GAP SERPL CALC-SCNC: 9 MMOL/L (ref 8–16)
APTT PPP: 25.1 SEC (ref 21–32)
APTT PPP: 49.5 SEC (ref 21–32)
APTT PPP: 56.1 SEC (ref 21–32)
AST SERPL-CCNC: 116 U/L (ref 10–40)
AST SERPL-CCNC: 175 U/L (ref 10–40)
BASOPHILS # BLD AUTO: 0.05 K/UL (ref 0–0.2)
BASOPHILS # BLD AUTO: 0.05 K/UL (ref 0–0.2)
BASOPHILS # BLD AUTO: 0.06 K/UL (ref 0–0.2)
BASOPHILS NFR BLD: 0.3 % (ref 0–1.9)
BASOPHILS NFR BLD: 0.3 % (ref 0–1.9)
BASOPHILS NFR BLD: 0.4 % (ref 0–1.9)
BILIRUB SERPL-MCNC: 0.4 MG/DL (ref 0.1–1)
BILIRUB SERPL-MCNC: 0.5 MG/DL (ref 0.1–1)
BUN SERPL-MCNC: 23 MG/DL (ref 8–23)
BUN SERPL-MCNC: 25 MG/DL (ref 8–23)
CALCIUM SERPL-MCNC: 8.6 MG/DL (ref 8.7–10.5)
CALCIUM SERPL-MCNC: 8.7 MG/DL (ref 8.7–10.5)
CHLORIDE SERPL-SCNC: 101 MMOL/L (ref 95–110)
CHLORIDE SERPL-SCNC: 104 MMOL/L (ref 95–110)
CO2 SERPL-SCNC: 25 MMOL/L (ref 23–29)
CO2 SERPL-SCNC: 25 MMOL/L (ref 23–29)
CREAT SERPL-MCNC: 1.2 MG/DL (ref 0.5–1.4)
CREAT SERPL-MCNC: 1.2 MG/DL (ref 0.5–1.4)
DIFFERENTIAL METHOD BLD: ABNORMAL
EOSINOPHIL # BLD AUTO: 0 K/UL (ref 0–0.5)
EOSINOPHIL NFR BLD: 0.1 % (ref 0–8)
ERYTHROCYTE [DISTWIDTH] IN BLOOD BY AUTOMATED COUNT: 15.7 % (ref 11.5–14.5)
ERYTHROCYTE [DISTWIDTH] IN BLOOD BY AUTOMATED COUNT: 15.8 % (ref 11.5–14.5)
ERYTHROCYTE [DISTWIDTH] IN BLOOD BY AUTOMATED COUNT: 15.8 % (ref 11.5–14.5)
EST. GFR  (NO RACE VARIABLE): >60 ML/MIN/1.73 M^2
EST. GFR  (NO RACE VARIABLE): >60 ML/MIN/1.73 M^2
GLUCOSE SERPL-MCNC: 159 MG/DL (ref 70–110)
GLUCOSE SERPL-MCNC: 189 MG/DL (ref 70–110)
HCT VFR BLD AUTO: 28.6 % (ref 40–54)
HCT VFR BLD AUTO: 28.6 % (ref 40–54)
HCT VFR BLD AUTO: 29.8 % (ref 40–54)
HGB BLD-MCNC: 9.6 G/DL (ref 14–18)
HGB BLD-MCNC: 9.6 G/DL (ref 14–18)
HGB BLD-MCNC: 9.7 G/DL (ref 14–18)
IMM GRANULOCYTES # BLD AUTO: 0.07 K/UL (ref 0–0.04)
IMM GRANULOCYTES NFR BLD AUTO: 0.5 % (ref 0–0.5)
LACTATE SERPL-SCNC: 2.5 MMOL/L (ref 0.5–2.2)
LACTATE SERPL-SCNC: 3 MMOL/L (ref 0.5–2.2)
LYMPHOCYTES # BLD AUTO: 1.4 K/UL (ref 1–4.8)
LYMPHOCYTES # BLD AUTO: 1.6 K/UL (ref 1–4.8)
LYMPHOCYTES # BLD AUTO: 1.6 K/UL (ref 1–4.8)
LYMPHOCYTES NFR BLD: 11.1 % (ref 18–48)
LYMPHOCYTES NFR BLD: 11.1 % (ref 18–48)
LYMPHOCYTES NFR BLD: 9 % (ref 18–48)
MAGNESIUM SERPL-MCNC: 1.7 MG/DL (ref 1.6–2.6)
MCH RBC QN AUTO: 27.2 PG (ref 27–31)
MCH RBC QN AUTO: 27.3 PG (ref 27–31)
MCH RBC QN AUTO: 27.3 PG (ref 27–31)
MCHC RBC AUTO-ENTMCNC: 32.6 G/DL (ref 32–36)
MCHC RBC AUTO-ENTMCNC: 33.6 G/DL (ref 32–36)
MCHC RBC AUTO-ENTMCNC: 33.6 G/DL (ref 32–36)
MCV RBC AUTO: 81 FL (ref 82–98)
MCV RBC AUTO: 81 FL (ref 82–98)
MCV RBC AUTO: 84 FL (ref 82–98)
MONOCYTES # BLD AUTO: 1.4 K/UL (ref 0.3–1)
MONOCYTES # BLD AUTO: 1.4 K/UL (ref 0.3–1)
MONOCYTES # BLD AUTO: 1.8 K/UL (ref 0.3–1)
MONOCYTES NFR BLD: 11.7 % (ref 4–15)
MONOCYTES NFR BLD: 9.3 % (ref 4–15)
MONOCYTES NFR BLD: 9.3 % (ref 4–15)
NEUTROPHILS # BLD AUTO: 11.6 K/UL (ref 1.8–7.7)
NEUTROPHILS # BLD AUTO: 11.6 K/UL (ref 1.8–7.7)
NEUTROPHILS # BLD AUTO: 11.9 K/UL (ref 1.8–7.7)
NEUTROPHILS NFR BLD: 78.3 % (ref 38–73)
NEUTROPHILS NFR BLD: 78.7 % (ref 38–73)
NEUTROPHILS NFR BLD: 78.7 % (ref 38–73)
NRBC BLD-RTO: 0 /100 WBC
PLATELET # BLD AUTO: 182 K/UL (ref 150–450)
PLATELET # BLD AUTO: 182 K/UL (ref 150–450)
PLATELET # BLD AUTO: 189 K/UL (ref 150–450)
PMV BLD AUTO: 10.7 FL (ref 9.2–12.9)
PMV BLD AUTO: 10.7 FL (ref 9.2–12.9)
PMV BLD AUTO: 11.2 FL (ref 9.2–12.9)
POCT GLUCOSE: 105 MG/DL (ref 70–110)
POCT GLUCOSE: 163 MG/DL (ref 70–110)
POCT GLUCOSE: 165 MG/DL (ref 70–110)
POCT GLUCOSE: 192 MG/DL (ref 70–110)
POCT GLUCOSE: 203 MG/DL (ref 70–110)
POTASSIUM SERPL-SCNC: 3.1 MMOL/L (ref 3.5–5.1)
POTASSIUM SERPL-SCNC: 4.6 MMOL/L (ref 3.5–5.1)
PROT SERPL-MCNC: 6.1 G/DL (ref 6–8.4)
PROT SERPL-MCNC: 6.5 G/DL (ref 6–8.4)
RBC # BLD AUTO: 3.52 M/UL (ref 4.6–6.2)
RBC # BLD AUTO: 3.52 M/UL (ref 4.6–6.2)
RBC # BLD AUTO: 3.56 M/UL (ref 4.6–6.2)
SODIUM SERPL-SCNC: 136 MMOL/L (ref 136–145)
SODIUM SERPL-SCNC: 138 MMOL/L (ref 136–145)
WBC # BLD AUTO: 14.72 K/UL (ref 3.9–12.7)
WBC # BLD AUTO: 14.72 K/UL (ref 3.9–12.7)
WBC # BLD AUTO: 15.23 K/UL (ref 3.9–12.7)

## 2024-12-08 PROCEDURE — 25000003 PHARM REV CODE 250: Performed by: INTERNAL MEDICINE

## 2024-12-08 PROCEDURE — 99291 CRITICAL CARE FIRST HOUR: CPT | Mod: ,,, | Performed by: INTERNAL MEDICINE

## 2024-12-08 PROCEDURE — 63600175 PHARM REV CODE 636 W HCPCS: Performed by: STUDENT IN AN ORGANIZED HEALTH CARE EDUCATION/TRAINING PROGRAM

## 2024-12-08 PROCEDURE — 20000000 HC ICU ROOM

## 2024-12-08 PROCEDURE — 99900035 HC TECH TIME PER 15 MIN (STAT)

## 2024-12-08 PROCEDURE — 25000003 PHARM REV CODE 250: Performed by: FAMILY MEDICINE

## 2024-12-08 PROCEDURE — 80053 COMPREHEN METABOLIC PANEL: CPT | Performed by: INTERNAL MEDICINE

## 2024-12-08 PROCEDURE — 63600175 PHARM REV CODE 636 W HCPCS

## 2024-12-08 PROCEDURE — 63600175 PHARM REV CODE 636 W HCPCS: Performed by: INTERNAL MEDICINE

## 2024-12-08 PROCEDURE — 83735 ASSAY OF MAGNESIUM: CPT | Performed by: INTERNAL MEDICINE

## 2024-12-08 PROCEDURE — 85025 COMPLETE CBC W/AUTO DIFF WBC: CPT | Performed by: INTERNAL MEDICINE

## 2024-12-08 PROCEDURE — 83605 ASSAY OF LACTIC ACID: CPT | Mod: 91 | Performed by: INTERNAL MEDICINE

## 2024-12-08 PROCEDURE — 94761 N-INVAS EAR/PLS OXIMETRY MLT: CPT

## 2024-12-08 PROCEDURE — 85730 THROMBOPLASTIN TIME PARTIAL: CPT | Mod: 91 | Performed by: INTERNAL MEDICINE

## 2024-12-08 RX ORDER — FENTANYL CITRATE 50 UG/ML
25 INJECTION, SOLUTION INTRAMUSCULAR; INTRAVENOUS ONCE
Status: COMPLETED | OUTPATIENT
Start: 2024-12-08 | End: 2024-12-08

## 2024-12-08 RX ORDER — FUROSEMIDE 20 MG/1
20 TABLET ORAL DAILY
Status: DISCONTINUED | OUTPATIENT
Start: 2024-12-09 | End: 2024-12-10 | Stop reason: HOSPADM

## 2024-12-08 RX ORDER — POTASSIUM CHLORIDE 20 MEQ/1
40 TABLET, EXTENDED RELEASE ORAL ONCE
Status: COMPLETED | OUTPATIENT
Start: 2024-12-08 | End: 2024-12-08

## 2024-12-08 RX ORDER — INSULIN GLARGINE 100 [IU]/ML
20 INJECTION, SOLUTION SUBCUTANEOUS DAILY
Status: DISCONTINUED | OUTPATIENT
Start: 2024-12-09 | End: 2024-12-10 | Stop reason: HOSPADM

## 2024-12-08 RX ORDER — POTASSIUM CHLORIDE 20 MEQ/1
40 TABLET, EXTENDED RELEASE ORAL
Status: DISPENSED | OUTPATIENT
Start: 2024-12-08 | End: 2024-12-08

## 2024-12-08 RX ORDER — SODIUM CHLORIDE 9 MG/ML
INJECTION, SOLUTION INTRAVENOUS CONTINUOUS
Status: DISCONTINUED | OUTPATIENT
Start: 2024-12-08 | End: 2024-12-08

## 2024-12-08 RX ORDER — FUROSEMIDE 10 MG/ML
20 INJECTION INTRAMUSCULAR; INTRAVENOUS ONCE
Status: COMPLETED | OUTPATIENT
Start: 2024-12-08 | End: 2024-12-08

## 2024-12-08 RX ORDER — PANTOPRAZOLE SODIUM 40 MG/1
40 TABLET, DELAYED RELEASE ORAL DAILY
Status: DISCONTINUED | OUTPATIENT
Start: 2024-12-09 | End: 2024-12-10 | Stop reason: HOSPADM

## 2024-12-08 RX ORDER — FUROSEMIDE 10 MG/ML
20 INJECTION INTRAMUSCULAR; INTRAVENOUS EVERY 12 HOURS
Status: DISCONTINUED | OUTPATIENT
Start: 2024-12-09 | End: 2024-12-08

## 2024-12-08 RX ORDER — MAGNESIUM SULFATE HEPTAHYDRATE 40 MG/ML
2 INJECTION, SOLUTION INTRAVENOUS ONCE
Status: COMPLETED | OUTPATIENT
Start: 2024-12-08 | End: 2024-12-08

## 2024-12-08 RX ORDER — FENTANYL CITRATE 50 UG/ML
INJECTION, SOLUTION INTRAMUSCULAR; INTRAVENOUS
Status: COMPLETED
Start: 2024-12-08 | End: 2024-12-08

## 2024-12-08 RX ORDER — METOPROLOL SUCCINATE 25 MG/1
25 TABLET, EXTENDED RELEASE ORAL DAILY
Status: DISCONTINUED | OUTPATIENT
Start: 2024-12-08 | End: 2024-12-08

## 2024-12-08 RX ORDER — INSULIN ASPART 100 [IU]/ML
10 INJECTION, SOLUTION INTRAVENOUS; SUBCUTANEOUS
Status: DISCONTINUED | OUTPATIENT
Start: 2024-12-08 | End: 2024-12-10 | Stop reason: HOSPADM

## 2024-12-08 RX ADMIN — AMIODARONE HYDROCHLORIDE 0.5 MG/MIN: 1.8 INJECTION, SOLUTION INTRAVENOUS at 05:12

## 2024-12-08 RX ADMIN — POTASSIUM CHLORIDE 40 MEQ: 1500 TABLET, EXTENDED RELEASE ORAL at 08:12

## 2024-12-08 RX ADMIN — INSULIN ASPART 2 UNITS: 100 INJECTION, SOLUTION INTRAVENOUS; SUBCUTANEOUS at 06:12

## 2024-12-08 RX ADMIN — FENTANYL CITRATE 25 MCG: 50 INJECTION INTRAMUSCULAR; INTRAVENOUS at 12:12

## 2024-12-08 RX ADMIN — TICAGRELOR 90 MG: 90 TABLET ORAL at 08:12

## 2024-12-08 RX ADMIN — METOPROLOL SUCCINATE 12.5 MG: 25 TABLET, EXTENDED RELEASE ORAL at 05:12

## 2024-12-08 RX ADMIN — MUPIROCIN: 20 OINTMENT TOPICAL at 08:12

## 2024-12-08 RX ADMIN — ATORVASTATIN CALCIUM 40 MG: 40 TABLET, FILM COATED ORAL at 08:12

## 2024-12-08 RX ADMIN — INSULIN GLARGINE 10 UNITS: 100 INJECTION, SOLUTION SUBCUTANEOUS at 08:12

## 2024-12-08 RX ADMIN — HEPARIN SODIUM 12 UNITS/KG/HR: 10000 INJECTION, SOLUTION INTRAVENOUS at 05:12

## 2024-12-08 RX ADMIN — INSULIN ASPART 2 UNITS: 100 INJECTION, SOLUTION INTRAVENOUS; SUBCUTANEOUS at 11:12

## 2024-12-08 RX ADMIN — MAGNESIUM SULFATE HEPTAHYDRATE 2 G: 40 INJECTION, SOLUTION INTRAVENOUS at 10:12

## 2024-12-08 RX ADMIN — INSULIN ASPART 4 UNITS: 100 INJECTION, SOLUTION INTRAVENOUS; SUBCUTANEOUS at 07:12

## 2024-12-08 RX ADMIN — FUROSEMIDE 20 MG: 10 INJECTION, SOLUTION INTRAMUSCULAR; INTRAVENOUS at 05:12

## 2024-12-08 RX ADMIN — VANCOMYCIN HYDROCHLORIDE 1500 MG: 1.5 INJECTION, POWDER, LYOPHILIZED, FOR SOLUTION INTRAVENOUS at 10:12

## 2024-12-08 RX ADMIN — POTASSIUM CHLORIDE 40 MEQ: 1500 TABLET, EXTENDED RELEASE ORAL at 10:12

## 2024-12-08 RX ADMIN — POTASSIUM CHLORIDE 40 MEQ: 1500 TABLET, EXTENDED RELEASE ORAL at 05:12

## 2024-12-08 RX ADMIN — FENTANYL CITRATE 25 MCG: 50 INJECTION, SOLUTION INTRAMUSCULAR; INTRAVENOUS at 12:12

## 2024-12-08 RX ADMIN — ASPIRIN 81 MG: 81 TABLET, COATED ORAL at 08:12

## 2024-12-08 RX ADMIN — INSULIN ASPART 5 UNITS: 100 INJECTION, SOLUTION INTRAVENOUS; SUBCUTANEOUS at 07:12

## 2024-12-08 RX ADMIN — INSULIN ASPART 10 UNITS: 100 INJECTION, SOLUTION INTRAVENOUS; SUBCUTANEOUS at 11:12

## 2024-12-08 RX ADMIN — SODIUM CHLORIDE: 9 INJECTION, SOLUTION INTRAVENOUS at 05:12

## 2024-12-08 RX ADMIN — INSULIN ASPART 10 UNITS: 100 INJECTION, SOLUTION INTRAVENOUS; SUBCUTANEOUS at 06:12

## 2024-12-08 NOTE — NURSING
Dr. Quiroz at bedside. Verbal order to hold heparin and amiodarone gtts and APTT for 11. Plan to remove IABP later today

## 2024-12-08 NOTE — PLAN OF CARE
Care Plan    POC reviewed with Rigoberto Quintana and family. Questions and concerns addressed. No acute events today. Pt progressing toward goals. Will continue to monitor. See below and flowsheets for full assessment and VS info.       Neuro:  [place] Coma Scale  Best Eye Response: 4-->(E4) spontaneous  Best Motor Response: 6-->(M6) obeys commands  Best Verbal Response: 5-->(V5) oriented  [place] Coma Scale Score: 15  Assessment Qualifiers: patient not sedated/intubated  Pupil PERRLA: yes  24 hr Temp:  [98.2 °F (36.8 °C)-99.1 °F (37.3 °C)]      CV:  Rhythm: normal sinus rhythm  DVT prophylaxis: VTE Core Measure: Pharmacological prophylaxis initiated/maintained    Resp:     Oxygen Concentration (%): 60    GI/:  GI prophylaxis: {YES/NO:51516}  Diet/Nutrition Received: consistent carb/diabetic diet  Last Bowel Movement: 12/06/24      Intake/Output Summary (Last 24 hours) at 12/7/2024 1848  Last data filed at 12/7/2024 1800  Gross per 24 hour   Intake 2304.26 ml   Output 3376 ml   Net -1071.74 ml       Labs/Accuchecks:  Recent Labs   Lab 12/07/24  0405   WBC 21.36*   RBC 4.13*   HGB 11.2*   HCT 33.9*         Recent Labs   Lab 12/07/24  0405      K 4.1   CO2 19*      BUN 26*   CREATININE 1.3   ALKPHOS 51   ALT 83*   *   BILITOT 0.2      Recent Labs   Lab 12/06/24  1658 12/06/24 2008 12/07/24  1144   INR 1.0  --   --    APTT 24.2   < > 31.0    < > = values in this interval not displayed.      Recent Labs   Lab 12/06/24 1658   TROPONINI 0.149*       Electrolytes: {electrolyte replacement:02174}  Accuchecks: {accuchecks:84964}    Gtts/LDAs:   amiodarone in dextrose 5%  0.5 mg/min Intravenous Continuous 16.7 mL/hr at 12/07/24 1800 0.5 mg/min at 12/07/24 1800    heparin (porcine) in D5W  0-40 Units/kg/hr (Adjusted) Intravenous Continuous 10.4 mL/hr at 12/07/24 1800 15 Units/kg/hr at 12/07/24 1800    NORepinephrine bitartrate-D5W    Continuous PRN   Stopped at 12/06/24 1950    tirofiban-0.9%  Follow up in person or by phone if symptoms worsen, if new symptoms appear, or if not improving within 2-3 days.    Please understand that your diagnosis is provisional it can and may change. The diagnosis that you are discharged with is based on the symptoms you described and the diagnostic information available today. If any new symptoms occur or worsen, you should seek re-evaluation. If any symptoms persist, please follow up for reassessment.      Urinary Tract Infections in Women  Urinary tract infections (UTIs) are most often caused by bacteria (germs). These bacteria enter the urinary tract. The bacteria may come from outside the body. Or they may travel from the skin outside the rectum or vagina into the urethra. Female anatomy makes it easier for bacteria from the bowel to enter a woman’s urinary tract, which is the most common source of UTI. This means women develop UTIs more often than men. Pain in or around the urinary tract is a common UTI symptom. But the only way to know for sure if you have a UTI for the health care provider to test your urine. The two tests that may be done are the urinalysis and urine culture.  Types of UTIs  · Cystitis: A bladder infection (cystitis) is the most common UTI in women. You may have urgent or frequent urination. You may also have pain, burning when you urinate, and bloody urine.  · Urethritis: This is an inflamed urethra, which is the tube that carries urine from the bladder to outside the body. You may have lower stomach or back pain. You may also have urgent or frequent urination.  · Pyelonephritis: This is a kidney infection. If not treated, it can be serious and damage your kidneys. In severe cases, you may be hospitalized. You may have a fever and lower back pain.  Medications to treat a UTI  Most UTIs are treated with antibiotics. These kill the bacteria. The length of time you need to take them depends on the type of infection. It may be as short as 3 days. If  "sodium chloride 12.5 mg/250ml    Continuous PRN   Stopped at 12/06/24 2000       Lines/Drains/Airways       Drain  Duration                  Urethral Catheter 12/06/24 2040 Double-lumen 16 Fr. <1 day              Line  Duration                  IABP 12/06/24 8.0 Fr. 40 mL 1 day              Peripheral Intravenous Line  Duration                  Peripheral IV - Single Lumen 12/06/24 1653 18 G Right Forearm 1 day         Peripheral IV - Single Lumen 12/06/24 1654 18 G Left Antecubital 1 day         Peripheral IV - Single Lumen 12/07/24 0858 20 G Anterior;Proximal;Right Forearm <1 day                    Skin/Wounds  Bathing/Skin Care: dressed/undressed (12/06/24 2130)  Wounds: {YES NO:39552}  Wound care consulted: {YES NO:52040}    Consults  Consults (From admission, onward)          Status Ordering Provider     Inpatient consult to Hospital Medicine-[facility]  Once        Provider:  Daniel Decker MD    Completed OTONIEL SMITH     Pharmacy to dose Vancomycin consult  Once        Provider:  (Not yet assigned)   Placed in "And" Linked Group    Acknowledged OTONIEL SMITH          " you have repeated UTIs, a low-dose antibiotic may be needed for several months. Take antibiotics exactly as directed. Don’t stop taking them until all of the medication is gone. If you stop taking the antibiotic too soon, the infection may not go away, and you may develop a resistance to the antibiotic. This can make it much harder to treat.  Lifestyle changes to treat and prevent UTIs  The lifestyle changes below will help get rid of your UTI. They may also help prevent future UTIs.  · Drink plenty of fluids. This includes water, juice, or other caffeine-free drinks. Fluids help flush bacteria out of your body.  · Empty your bladder. Always empty your bladder when you feel the urge to urinate. And always urinate before going to sleep. Urine that stays in your bladder can lead to infection. Try to urinate before and after sex as well.  · Practice good personal hygiene. Wipe yourself from front to back after using the toilet. This helps keep bacteria from getting into the urethra.  · Use condoms during sex. These help prevent UTIs caused by sexually transmitted bacteria. Also, avoid using spermicides during sex. These can increase the risk of UTIs. Choose other forms of birth control instead. For women who tend to get UTIs after sex, a low-dose of a preventive antibiotic may be used. Be sure to discuss this option with your health care provider.  · Follow up with your health care provider as directed. He or she may test to make sure the infection has cleared. If necessary, additional treatment may be started.  © 6627-9672 [employer]. [address] [place] [address], [place] [address]. All rights reserved. This information is not intended as a substitute for professional medical care. Always follow your healthcare professional's instructions.

## 2024-12-08 NOTE — SUBJECTIVE & OBJECTIVE
Interval History:  Patient was seen in the morning, denies any chest pain on room air denies any nausea vomiting or diarrhea still in the ICU on balloon pump that is being weaned gradually, blood glucose is elevated adjusted insulin into 20 unit long-acting plus 10 units t.i.d..    Review of Systems   Constitutional:  Positive for activity change. Negative for appetite change, chills, diaphoresis, fatigue, fever and unexpected weight change.   HENT: Negative.     Eyes: Negative.    Respiratory:  Negative for apnea, cough, choking, chest tightness, shortness of breath, wheezing and stridor.    Cardiovascular:  Negative for chest pain, palpitations and leg swelling.   Gastrointestinal: Negative.    Endocrine: Negative.    Genitourinary: Negative.    Musculoskeletal: Negative.    Neurological: Negative.    Psychiatric/Behavioral: Negative.       Objective:     Vital Signs (Most Recent):  Temp: 99.2 °F (37.3 °C) (12/08/24 1130)  Pulse: 75 (12/08/24 1130)  Resp: (!) 25 (12/08/24 1226)  BP: 127/68 (12/08/24 1130)  SpO2: 96 % (12/08/24 1130) Vital Signs (24h Range):  Temp:  [99.1 °F (37.3 °C)-100 °F (37.8 °C)] 99.2 °F (37.3 °C)  Pulse:  [] 75  Resp:  [10-38] 25  SpO2:  [92 %-100 %] 96 %  BP: ()/(49-93) 127/68     Weight: 77.6 kg (171 lb)  Body mass index is 27.6 kg/m².    Intake/Output Summary (Last 24 hours) at 12/8/2024 1318  Last data filed at 12/8/2024 0922  Gross per 24 hour   Intake 1002.52 ml   Output 1742 ml   Net -739.48 ml         Physical Exam  Constitutional:       Appearance: Normal appearance.   HENT:      Head: Normocephalic and atraumatic.      Nose: Nose normal.   Eyes:      Extraocular Movements: Extraocular movements intact.      Pupils: Pupils are equal, round, and reactive to light.   Cardiovascular:      Rate and Rhythm: Normal rate and regular rhythm.      Comments:  Balloon pump  Pulmonary:      Effort: Pulmonary effort is normal.      Breath sounds: Normal breath sounds.   Abdominal:       General: Abdomen is flat.      Palpations: Abdomen is soft.   Musculoskeletal:         General: Normal range of motion.      Cervical back: Normal range of motion.   Skin:     General: Skin is warm.      Capillary Refill: Capillary refill takes less than 2 seconds.   Neurological:      Mental Status: He is alert.   Psychiatric:         Mood and Affect: Mood normal.             Significant Labs: All pertinent labs within the past 24 hours have been reviewed.  Recent Lab Results  (Last 5 results in the past 24 hours)        12/08/24  1115   12/08/24  1042   12/08/24  0911   12/08/24  0744   12/08/24  0732        PTT   25.1  Comment: Refer to local heparin nomogram for intensity/dose specific   therapeutic   range.  LOT^050^APTT FSL^[id number]       49.5  Comment: Refer to local heparin nomogram for intensity/dose specific   therapeutic   range.  LOT^050^APTT FSL^[id number]           Lactic Acid Level     2.5  Comment: Falsely low lactic acid results can be found in samples   containing >=13.0 mg/dL total bilirubin and/or >=3.5 mg/dL   direct bilirubin.             Magnesium        1.7         POCT Glucose 192         203                              Significant Imaging: I have reviewed all pertinent imaging results/findings within the past 24 hours.

## 2024-12-08 NOTE — ASSESSMENT & PLAN NOTE
Patient's FSGs are controlled on current medication regimen.  Last A1c reviewed-   Lab Results   Component Value Date    HGBA1C 7.3 (H) 12/07/2024     Most recent fingerstick glucose reviewed-   Recent Labs   Lab 12/07/24  1822 12/07/24  2204 12/08/24  0732 12/08/24  1115   POCTGLUCOSE 177* 158* 203* 192*       Current correctional scale  Medium  Increase anti-hyperglycemic dose as follows-   Antihyperglycemics (From admission, onward)      Start     Stop Route Frequency Ordered    12/09/24 0900  insulin glargine U-100 (Lantus) pen 20 Units         -- SubQ Daily 12/08/24 0842    12/08/24 1130  insulin aspart U-100 pen 10 Units         -- SubQ 3 times daily with meals 12/08/24 0842    12/07/24 1038  insulin aspart U-100 pen 0-10 Units         -- SubQ Before meals & nightly PRN 12/07/24 0939    12/07/24 1013  insulin aspart U-100 pen 0-10 Units         -- SubQ Before meals & nightly PRN 12/07/24 0914          Hold Oral hypoglycemics while patient is in the hospital.    -12/8 blood glucose is still above 200 goal of 140-180, insulin was adjusted 20 unit long-acting plus t.i.d. short-acting with sliding scale and hypoglycemic protocol on board.

## 2024-12-08 NOTE — PLAN OF CARE
Care Plan    POC reviewed with Rigoberto Quintana and family. Questions and concerns addressed. No acute events today. Pt progressing toward goals. Will continue to monitor. See below and flowsheets for full assessment and VS info.       Neuro:  [place] Coma Scale  Best Eye Response: 4-->(E4) spontaneous  Best Motor Response: 6-->(M6) obeys commands  Best Verbal Response: 5-->(V5) oriented  [place] Coma Scale Score: 15  Assessment Qualifiers: patient not sedated/intubated  Pupil PERRLA: yes  24 hr Temp:  [98.2 °F (36.8 °C)-99.1 °F (37.3 °C)]      CV:  Rhythm: normal sinus rhythm  DVT prophylaxis: VTE Core Measure: Pharmacological prophylaxis initiated/maintained    Resp:     Oxygen Concentration (%): 60    GI/:  GI prophylaxis: no  Diet/Nutrition Received: consistent carb/diabetic diet  Last Bowel Movement: 12/06/24      Intake/Output Summary (Last 24 hours) at 12/7/2024 1849  Last data filed at 12/7/2024 1800  Gross per 24 hour   Intake 2304.26 ml   Output 3376 ml   Net -1071.74 ml       Labs/Accuchecks:  Recent Labs   Lab 12/07/24  0405   WBC 21.36*   RBC 4.13*   HGB 11.2*   HCT 33.9*         Recent Labs   Lab 12/07/24  0405      K 4.1   CO2 19*      BUN 26*   CREATININE 1.3   ALKPHOS 51   ALT 83*   *   BILITOT 0.2      Recent Labs   Lab 12/06/24  1658 12/06/24 2008 12/07/24  1144   INR 1.0  --   --    APTT 24.2   < > 31.0    < > = values in this interval not displayed.      Recent Labs   Lab 12/06/24 1658   TROPONINI 0.149*       Electrolytes: Electrolytes replaced  Accuchecks: ACHS    Gtts/LDAs:   amiodarone in dextrose 5%  0.5 mg/min Intravenous Continuous 16.7 mL/hr at 12/07/24 1800 0.5 mg/min at 12/07/24 1800    heparin (porcine) in D5W  0-40 Units/kg/hr (Adjusted) Intravenous Continuous 10.4 mL/hr at 12/07/24 1800 15 Units/kg/hr at 12/07/24 1800    NORepinephrine bitartrate-D5W    Continuous PRN   Stopped at 12/06/24 1950    tirofiban-0.9% sodium chloride 12.5 mg/250ml     "Continuous PRN   Stopped at 12/06/24 2000       Lines/Drains/Airways       Drain  Duration                  Urethral Catheter 12/06/24 2040 Double-lumen 16 Fr. <1 day              Line  Duration                  IABP 12/06/24 8.0 Fr. 40 mL 1 day              Peripheral Intravenous Line  Duration                  Peripheral IV - Single Lumen 12/06/24 1653 18 G Right Forearm 1 day         Peripheral IV - Single Lumen 12/06/24 1654 18 G Left Antecubital 1 day         Peripheral IV - Single Lumen 12/07/24 0858 20 G Anterior;Proximal;Right Forearm <1 day                    Skin/Wounds  Bathing/Skin Care: dressed/undressed (12/06/24 2130)  Wounds: No  Wound care consulted: No    Consults  Consults (From admission, onward)          Status Ordering Provider     Inpatient consult to Hospital Medicine-[facility]  Once        Provider:  Daniel Decker MD    Completed OTONIEL SMITH     Pharmacy to dose Vancomycin consult  Once        Provider:  (Not yet assigned)   Placed in "And" Linked Group    Acknowledged OTONIEL SMITH          "

## 2024-12-08 NOTE — PLAN OF CARE
Pt AAOx4. Pt denies any chest pain or SOB throughout the shift. SR on monitor. Currently on 1L on NC, O2 sat >94%. K replaced this morning. No need for replacements at this time.  IABP currently 1:1. +2 PP. Cap refill <3. Pt reminded to keep right leg straight, verbalized understanding. Amio and heparin gtt infusing. Next pTT scheduled @ 8:09am. U/O about 800cc for the shift. Bed locked at lowest position, call light within reach. Daughter at bedside.

## 2024-12-08 NOTE — ASSESSMENT & PLAN NOTE
Patient's blood pressure range in the last 24 hours was: BP  Min: 97/49  Max: 139/65.The patient's inpatient anti-hypertensive regimen is listed below:  Current Antihypertensives  furosemide injection 20 mg, Every 12 hours, Intravenous  metoprolol succinate (TOPROL-XL) 24 hr tablet 25 mg, Daily, Oral    Plan  - BP is controlled, no changes needed to their regimen  - controlled by Cardiology team

## 2024-12-08 NOTE — PROGRESS NOTES
[facility] - Intensive Care  Cardiology  Progress Note    Patient Name: Rigoberto Quintana  MRN: [id number]  Admission Date: 12/6/2024  Hospital Length of Stay: 2 days  Code Status: Full Code   Attending Physician: Lkoesh Quiroz MD   Primary Care Physician: JESSICA Miller [employer]  Expected Discharge Date:   Principal Problem:STEMI (ST elevation myocardial infarction)    Subjective:     Hospital Course:     12/7/2024 seen and examined this morning.  He is doing well.  Stable overnight.  No significant arrhythmias.  Balloon pump one-to-one weaned to 1-2 for 3 hours and he tolerated it well.   Weaned off BiPAP to nasal cannula.  Diuresing well.  Denies any chest pain.  Breathing is better.        12/08/2024:  Seen and examined this morning.  Hemodynamically stable.  No events overnight.  Monitor reviewed without any significant arrhythmias.  Diuresed very well.  Oxygen saturation improved to mid 90s on room air. IABP waned to 1:2 and 1:4 and he tolerated very well.  Mild drop in H&H noted without overt bleeding.         POST CATH NOTE     Date of Procedure: 12/6/2024      Procedure: Procedure(s) (LRB):  ANGIOGRAM, CORONARY ARTERY (N/A)  INSERTION, INTRA-AORTIC BALLOON PUMP  PTCA, Single Vessel  INSERTION, STENT, CORONARY ARTERY (N/A)  IVUS, Coronary  INSERTION, CATHETER, RIGHT HEART (Right)      Surgeons and Role:     * Lokesh Quiroz MD - Primary     Assisting Surgeon: None     Pre-Operative Diagnosis: STEMI     Post-Operative Diagnosis: Post-Op Diagnosis Codes:     * STEMI (ST elevation myocardial infarction) [I21.3]        Cath Results:  Access:  Right radial artery 5-6 Montserratian sheath  Right common femoral artery 8 Montserratian sheath  Left common femoral vein 7 Montserratian sheath        LM:  JAME II flow 99% stenosis mid left main reduced to 0% post  LAD: JAME II flow ostial 50% stenosis  LCx:  JAME II flow proximal circ 40-50% stenosis  RCA: JAME III flow  Dominance right     LVgram: LVEDP 17 mmHg     Intervention:    Status post successful emergent PTCA and stenting to left main 99% stenosis with 4.5 x 16 mm RIAN post dilated with 4.5 NC balloon.  IVUS guided with excellent results.  We had to intervene on the left main as the patient was unstable with frequent PVCs and had V-tach and was shocked 3 times by EMS.  The procedure was performed with mechanical support with intra-aortic balloon pump that was placed before the coronary intervention.  He required Levophed during the procedure that was weaned off.  BiPAP was placed prior to the procedure.     2. Intra-aortic balloon pump placement for mechanical support prior to PCI     3. Right heart catheterization for cardiogenic shock                 Closure device:    Vasc band right radial artery  Right common femoral artery sheath and balloon pump secured in place  Left common femoral vein sheath sutured in place              RHC      Right heart catheterization pressures in mmHg     PCWP:  8    PA:  31   / 5   /16  RV:  37   /    /10  RA:   2     Oxygen saturations in%     PA:  46%  RA:  51%  AO  95%        Cardiac output:  6.3      l/min     Zehra  Cardiac index :   1.75     l/min     On Levophed 0.04           Assessment:          Review of Systems   Eyes:  Negative for blurred vision.   Cardiovascular:  Negative for chest pain.   Respiratory:  Negative for shortness of breath.    Musculoskeletal:  Positive for arthritis.   Gastrointestinal:  Negative for abdominal pain.     Objective:     Vital Signs (Most Recent):  Temp: 99.2 °F (37.3 °C) (12/08/24 0715)  Pulse: 75 (12/08/24 1000)  Resp: (!) 25 (12/08/24 1000)  BP: 128/69 (12/08/24 1000)  SpO2: 97 % (12/08/24 1000) Vital Signs (24h Range):  Temp:  [98.8 °F (37.1 °C)-100 °F (37.8 °C)] 99.2 °F (37.3 °C)  Pulse:  [] 75  Resp:  [10-38] 25  SpO2:  [92 %-100 %] 97 %  BP: ()/(49-98) 128/69     Weight: 77.6 kg (171 lb)  Body mass index is 27.6 kg/m².    SpO2: 97 %         Intake/Output Summary (Last 24 hours) at  12/8/2024 1046  Last data filed at 12/8/2024 0922  Gross per 24 hour   Intake 1572.52 ml   Output 2712 ml   Net -1139.48 ml       Lines/Drains/Airways       Drain  Duration                  Urethral Catheter 12/06/24 2040 Double-lumen 16 Fr. 1 day              Line  Duration                  IABP 12/06/24 8.0 Fr. 40 mL 2 days              Peripheral Intravenous Line  Duration                  Peripheral IV - Single Lumen 12/06/24 1653 18 G Right Forearm 1 day         Peripheral IV - Single Lumen 12/06/24 1654 18 G Left Antecubital 1 day         Peripheral IV - Single Lumen 12/07/24 0858 20 G Anterior;Proximal;Right Forearm 1 day                    Physical Exam  Constitutional:       Appearance: He is ill-appearing.   HENT:      Head: Normocephalic and atraumatic.   Cardiovascular:      Rate and Rhythm: Normal rate and regular rhythm.      Heart sounds: No murmur heard.  Pulmonary:      Breath sounds: Rales present.   Musculoskeletal:      Right lower leg: No edema.      Left lower leg: No edema.   Skin:     General: Skin is warm and dry.   Neurological:      General: No focal deficit present.      Mental Status: He is oriented to person, place, and time.   Psychiatric:         Behavior: Behavior normal.         Significant Labs: BMP:   Recent Labs   Lab 12/06/24 1658 12/06/24  2240 12/07/24  0005 12/07/24  0405 12/08/24  0409 12/08/24  0744   *  --   --  302* 189*  --      --   --  137 136  --    K 3.1*  --  4.6 4.1 3.1*  --      --   --  105 101  --    CO2 17*  --   --  19* 25  --    BUN 20  --   --  26* 25*  --    CREATININE 1.6*  --   --  1.3 1.2  --    CALCIUM 9.2  --   --  8.8 8.6*  --    MG  --  1.6  --  2.2  --  1.7   , CMP   Recent Labs   Lab 12/06/24 1658 12/07/24  0005 12/07/24  0405 12/08/24  0409     --  137 136   K 3.1* 4.6 4.1 3.1*     --  105 101   CO2 17*  --  19* 25   *  --  302* 189*   BUN 20  --  26* 25*   CREATININE 1.6*  --  1.3 1.2   CALCIUM 9.2  --  8.8  "8.6*   PROT 7.2  --  6.6 6.1   ALBUMIN 3.4*  --  3.3* 3.1*   BILITOT 0.2  --  0.2 0.4   ALKPHOS 56  --  51 46   AST 36  --  393* 175*   ALT 39  --  83* 60*   ANIONGAP 19*  --  13 10   , Lipid Panel No results for input(s): "CHOL", "HDL", "LDLCALC", "TRIG", "CHOLHDL" in the last 48 hours., Troponin   Recent Labs   Lab 12/06/24  1658   TROPONINI 0.149*   , and All pertinent lab results from the last 24 hours have been reviewed.    Significant Imaging: Echocardiogram: 2D echo with color flow doppler: No results found for this or any previous visit. and Transthoracic echo (TTE) complete (Cupid Only):   Results for orders placed or performed during the hospital encounter of 12/06/24   Echo   Result Value Ref Range    BSA 1.9 m2    A4C EF 35 %    LVIDd 4.3 3.5 - 6.0 cm    LV Systolic Volume 39.23 mL    LV Systolic Volume Index 21.0 mL/m2    LVIDs 3.1 2.1 - 4.0 cm    LV Diastolic Volume 81.37 mL    LV ESV A4C 56.70 mL    LV Diastolic Volume Index 43.51 mL/m2    LV EDV A2C [id number] mL    LV EDV A4C 62.44 mL    Left Ventricular End Systolic Volume by Teichholz Method 39.23 mL    Left Ventricular End Diastolic Volume by Teichholz Method 81.37 mL    IVS 1.1 0.6 - 1.1 cm    LVOT diameter 1.7 cm    LVOT area 2.3 cm2    FS 27.9 (A) 28 - 44 %    Left Ventricle Relative Wall Thickness 0.51 cm    PW 1.1 0.6 - 1.1 cm    LV mass 162.9 g    LV Mass Index 87.1 g/m2    MV Peak E Hima 0.48 m/s    MV Peak A Hima 0.47 m/s    TR Max Hima 1.17 m/s    E/A ratio 1.02     E wave deceleration time 148.06 msec    RA area length vol 17.59 mL    RA Area 10.0 cm2    RA Vol 18.90 mL    MV stenosis pressure 1/2 time 42.94 ms    MV valve area p 1/2 method 5.12 cm2    Triscuspid Valve Regurgitation Peak Gradient 5 mmHg    PV PEAK VELOCITY 1.10 m/s    PV peak gradient 5 mmHg    Pulmonary Valve Mean Velocity 0.77 m/s    Sinus 2.91 cm    STJ 2.58 cm    ZLVIDS -0.29     ZLVIDD -1.92     LA area A4C 19.80 cm2    Narrative      Left Ventricle: Regional " wall motion abnormalities present. See diagram   for wall motion findings. There is mildly reduced systolic function with a   visually estimated ejection fraction of 40 - 45%.    Right Ventricle: Normal right ventricular cavity size. Wall thickness   is normal. Systolic function is normal.    Tricuspid Valve: There is mild regurgitation.    The study was difficult due to patient's clinical status and poor   acoustic windows       Assessment and Plan:   69 years old male presented with chest pain, cardiac arrest ( VT) post 3 shocks, EKG by EMS anterior STEMI.  Left heart catheterization with 99% occluded left main post emergent left main intervention.       Brief HPI:   1. Acute anterior wall STEMI the culprit was left main 99%.  Post emergent intervention to left main with 1 RIAN.      2. Cardiogenic shock  (resolved)  3. Ventricular tachycardia (resolved)  4. Hypokalemia  5. Hyperglycemia improved  6. Acute kidney injury  (resolved)  7. Leukocytosis  8. Anemia   9. Elevated LFTs improving/related to shock state      Plan:   Status post successful intervention of the above  Aspirin and Brilinta  Tolerated weaning balloon pump.  We will remove today.  Hold heparin drip.  Check PTT  in 2 hours.  We will remove when APTT lower than 40. We will remove venous sheath as well    Hypokalemia noted.  Potassium replacement ordered.   Magnesium replacement ordered.  To keep Mag above 2    Strict I's and O's  High-intensity statin  2D echocardiogram prelim EF 40-45%.  Suboptimal windows.  We will repeat full echo again before discharge after intra-aortic balloon pump removed    Sliding scale insulin  Hospital Medicine team consulted for diabetes management.  Appreciate assistance    Leukocytosis most likely stress related.  Blood culture negative so far.  He was started empirically on vancomycin.  After removal of the balloon pump we will discontinue    Stop amiodarone.  No significant arrhythmias  We will initiate metoprolol  later tonight after balloon pump removal  Advance guideline directed medical therapy as able to tolerate ARB and SGLT 2 inhibitors.  Also advanced MRA as able to tolerate    Mild drop in H&H noted.  No overt bleeding.  We will monitor for time being    I had a thorough discussion with the patient and his family at bedside.  All their questions answered       Critical care time was 45 minutes. Interviewing the patient, reviewing chart, counseling and coordinating care       Active Diagnoses:    Diagnosis Date Noted POA    PRINCIPAL PROBLEM:  STEMI (ST elevation myocardial infarction) [I21.3] 12/07/2024 Yes    Diabetes mellitus due to underlying condition without complications [E08.9] 07/17/2015 Yes    Hyperlipidemia with target LDL less than 130 [E78.5] 07/17/2015 Yes    Benign essential HTN [I10] 07/17/2015 Yes      Problems Resolved During this Admission:       VTE Risk Mitigation (From admission, onward)           Ordered     heparin 25,000 units in dextrose 5% 250 mL (100 units/mL) infusion LOW INTENSITY nomogram - [facility]  Continuous        Question:  Begin at (units/kg/hr)  Answer:  12    12/07/24 1314     heparin 25,000 units in dextrose 5% (100 units/ml) IV bolus from bag LOW INTENSITY nomogram - [facility]  As needed (PRN)        Question:  Heparin Infusion Adjustment (DO NOT MODIFY ANSWER)  Answer:  [email or web address]\epic\Images\Pharmacy\HeparinInfusions\heparin LOW INTENSITY nomogram for [facility] [id number]    12/07/24 1309     heparin 25,000 units in dextrose 5% (100 units/ml) IV bolus from bag LOW INTENSITY nomogram - [facility]  As needed (PRN)        Question:  Heparin Infusion Adjustment (DO NOT MODIFY ANSWER)  Answer:  [email or web address]\epic\Images\Pharmacy\HeparinInfusions\heparin LOW INTENSITY nomogram for [facility] [id number].pdf    12/07/24 1309                    Lokesh Quiroz MD  Cardiology  [facility] - Intensive Care

## 2024-12-08 NOTE — PROGRESS NOTES
[facility] Medicine  Progress Note    Patient Name: Rigoberto Quintana  MRN: [id number]  Patient Class: IP- Inpatient   Admission Date: 12/6/2024  Length of Stay: 2 days  Attending Physician: Lokesh Quiroz MD  Primary Care Provider: JESSICA Miller [employer]        Subjective     Principal Problem:STEMI (ST elevation myocardial infarction)  Acute Condition:  STEMI        HPI:  69 years old male with past medical history of type 2 diabetes hypertension hyperlipidemia history of CAD, presented to the hospital with chest pain patient was found to have anterior wall STEMI, patient went into V-tach and got shocked and was started on amiodarone load, patient had acute hypoxic respiratory failure on initial presentation was placed on BiPAP then weaned off into nasal cannula patient also was hyperglycemic, patient was taken to cardiac catheterization urgently and was found to have anterior wall STEMI, with left main  99% occlusion status post RIAN, patient had balloon pump in place was successfully being weaned off.  Internal Medicine team was consulted for type 2 diabetes management.    Overview/Hospital Course:  No notes on file    Interval History:  Patient was seen in the morning, denies any chest pain on room air denies any nausea vomiting or diarrhea still in the ICU on balloon pump that is being weaned gradually, blood glucose is elevated adjusted insulin into 20 unit long-acting plus 10 units t.i.d..    Review of Systems   Constitutional:  Positive for activity change. Negative for appetite change, chills, diaphoresis, fatigue, fever and unexpected weight change.   HENT: Negative.     Eyes: Negative.    Respiratory:  Negative for apnea, cough, choking, chest tightness, shortness of breath, wheezing and stridor.    Cardiovascular:  Negative for chest pain, palpitations and leg swelling.   Gastrointestinal: Negative.    Endocrine: Negative.    Genitourinary: Negative.    Musculoskeletal:  Negative.    Neurological: Negative.    Psychiatric/Behavioral: Negative.       Objective:     Vital Signs (Most Recent):  Temp: 99.2 °F (37.3 °C) (12/08/24 1130)  Pulse: 75 (12/08/24 1130)  Resp: (!) 25 (12/08/24 1226)  BP: 127/68 (12/08/24 1130)  SpO2: 96 % (12/08/24 1130) Vital Signs (24h Range):  Temp:  [99.1 °F (37.3 °C)-100 °F (37.8 °C)] 99.2 °F (37.3 °C)  Pulse:  [] 75  Resp:  [10-38] 25  SpO2:  [92 %-100 %] 96 %  BP: ()/(49-93) 127/68     Weight: 77.6 kg (171 lb)  Body mass index is 27.6 kg/m².    Intake/Output Summary (Last 24 hours) at 12/8/2024 1318  Last data filed at 12/8/2024 0922  Gross per 24 hour   Intake 1002.52 ml   Output 1742 ml   Net -739.48 ml         Physical Exam  Constitutional:       Appearance: Normal appearance.   HENT:      Head: Normocephalic and atraumatic.      Nose: Nose normal.   Eyes:      Extraocular Movements: Extraocular movements intact.      Pupils: Pupils are equal, round, and reactive to light.   Cardiovascular:      Rate and Rhythm: Normal rate and regular rhythm.      Comments:  Balloon pump  Pulmonary:      Effort: Pulmonary effort is normal.      Breath sounds: Normal breath sounds.   Abdominal:      General: Abdomen is flat.      Palpations: Abdomen is soft.   Musculoskeletal:         General: Normal range of motion.      Cervical back: Normal range of motion.   Skin:     General: Skin is warm.      Capillary Refill: Capillary refill takes less than 2 seconds.   Neurological:      Mental Status: He is alert.   Psychiatric:         Mood and Affect: Mood normal.             Significant Labs: All pertinent labs within the past 24 hours have been reviewed.  Recent Lab Results  (Last 5 results in the past 24 hours)        12/08/24  1115   12/08/24  1042   12/08/24  0911   12/08/24  0744   12/08/24  0732        PTT   25.1  Comment: Refer to local heparin nomogram for intensity/dose specific   therapeutic   range.  LOT^050^APTT [id number]       49.5  Comment:  Refer to local heparin nomogram for intensity/dose specific   therapeutic   range.  LOT^050^APTT [id number]           Lactic Acid Level     2.5  Comment: Falsely low lactic acid results can be found in samples   containing >=13.0 mg/dL total bilirubin and/or >=3.5 mg/dL   direct bilirubin.             Magnesium        1.7         POCT Glucose 192         203                              Significant Imaging: I have reviewed all pertinent imaging results/findings within the past 24 hours.    Assessment and Plan     * STEMI (ST elevation myocardial infarction)  Cardiology is a primary team  Status post left main RIAN, weaning of bone-on-bone  -weaned off BiPAP and to nasal cannula now into room air doing well  -managed by cardiology    Diabetes mellitus due to underlying condition without complications  Patient's FSGs are controlled on current medication regimen.  Last A1c reviewed-   Lab Results   Component Value Date    HGBA1C 7.3 (H) 12/07/2024     Most recent fingerstick glucose reviewed-   Recent Labs   Lab 12/07/24  1822 12/07/24  2204 12/08/24  0732 12/08/24  1115   POCTGLUCOSE 177* 158* 203* 192*       Current correctional scale  Medium  Increase anti-hyperglycemic dose as follows-   Antihyperglycemics (From admission, onward)      Start     Stop Route Frequency Ordered    12/09/24 0900  insulin glargine U-100 (Lantus) pen 20 Units         -- SubQ Daily 12/08/24 0842    12/08/24 1130  insulin aspart U-100 pen 10 Units         -- SubQ 3 times daily with meals 12/08/24 0842    12/07/24 1038  insulin aspart U-100 pen 0-10 Units         -- SubQ Before meals & nightly PRN 12/07/24 0939    12/07/24 1013  insulin aspart U-100 pen 0-10 Units         -- SubQ Before meals & nightly PRN 12/07/24 0914          Hold Oral hypoglycemics while patient is in the hospital.    -12/8 blood glucose is still above 200 goal of 140-180, insulin was adjusted 20 unit long-acting plus t.i.d. short-acting with sliding scale and  hypoglycemic protocol on board.    Benign essential HTN  Patient's blood pressure range in the last 24 hours was: BP  Min: 97/49  Max: 139/65.The patient's inpatient anti-hypertensive regimen is listed below:  Current Antihypertensives  furosemide injection 20 mg, Every 12 hours, Intravenous  metoprolol succinate (TOPROL-XL) 24 hr tablet 25 mg, Daily, Oral    Plan  - BP is controlled, no changes needed to their regimen  - controlled by Cardiology team    Hyperlipidemia with target LDL less than 130    High-intensity statin  -recommend ordering lipid panel since last one  was from 2017      VTE Risk Mitigation (From admission, onward)           Ordered     heparin 25,000 units in dextrose 5% 250 mL (100 units/mL) infusion LOW INTENSITY nomogram - [facility]  Continuous        Question:  Begin at (units/kg/hr)  Answer:  12    12/07/24 1314     heparin 25,000 units in dextrose 5% (100 units/ml) IV bolus from bag LOW INTENSITY nomogram - [facility]  As needed (PRN)        Question:  Heparin Infusion Adjustment (DO NOT MODIFY ANSWER)  Answer:  [email or web address]\epic\Images\Pharmacy\HeparinInfusions\heparin LOW INTENSITY nomogram for [facility] [id number].pdf    12/07/24 1309     heparin 25,000 units in dextrose 5% (100 units/ml) IV bolus from bag LOW INTENSITY nomogram - [facility]  As needed (PRN)        Question:  Heparin Infusion Adjustment (DO NOT MODIFY ANSWER)  Answer:  [email or web address]\epic\Images\Pharmacy\HeparinInfusions\heparin LOW INTENSITY nomogram for [facility] [id number].pdf    12/07/24 1309                    Discharge Planning   TONYA:      Code Status: Full Code   Medical Readiness for Discharge Date:                Critical care time spent on the evaluation and treatment of severe organ dysfunction, review of pertinent labs and imaging studies, discussions with consulting providers and discussions with patient/family: 30 minutes.            Daniel Gusman MD  Department of Hospital Medicine   [facility] - Intensive Care

## 2024-12-08 NOTE — NURSING
Dr. Quiroz at bedside. Verbal order for 25mcg fentanyl IVP for IABP removal.     12:25- IABP and venous sheath removed. Manual pressure to be held for 45 minutes.

## 2024-12-09 ENCOUNTER — TELEPHONE (OUTPATIENT)
Dept: CARDIOLOGY | Facility: CLINIC | Age: 69
End: 2024-12-09

## 2024-12-09 LAB
ALBUMIN SERPL BCP-MCNC: 2.9 G/DL (ref 3.5–5.2)
ALP SERPL-CCNC: 43 U/L (ref 40–150)
ALT SERPL W/O P-5'-P-CCNC: 45 U/L (ref 10–44)
ANION GAP SERPL CALC-SCNC: 10 MMOL/L (ref 8–16)
APTT PPP: 32.9 SEC (ref 21–32)
ASCENDING AORTA: 2.75 CM
AST SERPL-CCNC: 91 U/L (ref 10–40)
AV INDEX (PROSTH): 0.59
AV MEAN GRADIENT: 7.8 MMHG
AV PEAK GRADIENT: 11.6 MMHG
AV VALVE AREA BY VELOCITY RATIO: 2.3 CM²
AV VALVE AREA: 1.7 CM²
AV VELOCITY RATIO: 0.82
BASOPHILS # BLD AUTO: 0.05 K/UL (ref 0–0.2)
BASOPHILS NFR BLD: 0.4 % (ref 0–1.9)
BILIRUB SERPL-MCNC: 0.6 MG/DL (ref 0.1–1)
BSA FOR ECHO PROCEDURE: 1.9 M2
BUN SERPL-MCNC: 25 MG/DL (ref 8–23)
CALCIUM SERPL-MCNC: 8.6 MG/DL (ref 8.7–10.5)
CHLORIDE SERPL-SCNC: 103 MMOL/L (ref 95–110)
CHOLEST SERPL-MCNC: 155 MG/DL (ref 120–199)
CHOLEST/HDLC SERPL: 2.4 {RATIO} (ref 2–5)
CO2 SERPL-SCNC: 23 MMOL/L (ref 23–29)
CREAT SERPL-MCNC: 1.3 MG/DL (ref 0.5–1.4)
CV ECHO LV RWT: 0.47 CM
DIFFERENTIAL METHOD BLD: ABNORMAL
DOP CALC AO PEAK VEL: 1.7 M/S
DOP CALC AO VTI: 32.6 CM
DOP CALC LVOT AREA: 2.8 CM2
DOP CALC LVOT DIAMETER: 1.9 CM
DOP CALC LVOT PEAK VEL: 1.4 M/S
DOP CALC LVOT STROKE VOLUME: 54.1 CM3
DOP CALC MV VTI: 15.7 CM
DOP CALCLVOT PEAK VEL VTI: 19.1 CM
E WAVE DECELERATION TIME: 228.58 MSEC
E/A RATIO: 1.33
E/E' RATIO: 8 M/S
ECHO LV POSTERIOR WALL: 1 CM (ref 0.6–1.1)
EOSINOPHIL # BLD AUTO: 0 K/UL (ref 0–0.5)
EOSINOPHIL NFR BLD: 0 % (ref 0–8)
ERYTHROCYTE [DISTWIDTH] IN BLOOD BY AUTOMATED COUNT: 15.9 % (ref 11.5–14.5)
EST. GFR  (NO RACE VARIABLE): 59 ML/MIN/1.73 M^2
FRACTIONAL SHORTENING: 30.2 % (ref 28–44)
GLUCOSE SERPL-MCNC: 177 MG/DL (ref 70–110)
HCT VFR BLD AUTO: 27.8 % (ref 40–54)
HDLC SERPL-MCNC: 64 MG/DL (ref 40–75)
HDLC SERPL: 41.3 % (ref 20–50)
HGB BLD-MCNC: 9.2 G/DL (ref 14–18)
IMM GRANULOCYTES # BLD AUTO: 0.04 K/UL (ref 0–0.04)
IMM GRANULOCYTES NFR BLD AUTO: 0.3 % (ref 0–0.5)
INTERVENTRICULAR SEPTUM: 0.8 CM (ref 0.6–1.1)
IVC DIAMETER: 1.48 CM
LDLC SERPL CALC-MCNC: 73.4 MG/DL (ref 63–159)
LEFT ATRIUM AREA SYSTOLIC (APICAL 2 CHAMBER): 19.66 CM2
LEFT ATRIUM AREA SYSTOLIC (APICAL 4 CHAMBER): 16.72 CM2
LEFT ATRIUM VOLUME INDEX MOD: 28.6 ML/M2
LEFT ATRIUM VOLUME MOD: 53.41 ML
LEFT INTERNAL DIMENSION IN SYSTOLE: 3 CM (ref 2.1–4)
LEFT VENTRICLE DIASTOLIC VOLUME INDEX: 44.88 ML/M2
LEFT VENTRICLE DIASTOLIC VOLUME: 83.93 ML
LEFT VENTRICLE END SYSTOLIC VOLUME APICAL 2 CHAMBER: 56.32 ML
LEFT VENTRICLE END SYSTOLIC VOLUME APICAL 4 CHAMBER: 45.88 ML
LEFT VENTRICLE MASS INDEX: 65.9 G/M2
LEFT VENTRICLE SYSTOLIC VOLUME INDEX: 18.4 ML/M2
LEFT VENTRICLE SYSTOLIC VOLUME: 34.44 ML
LEFT VENTRICULAR INTERNAL DIMENSION IN DIASTOLE: 4.3 CM (ref 3.5–6)
LEFT VENTRICULAR MASS: 123.3 G
LV LATERAL E/E' RATIO: 8 M/S
LV SEPTAL E/E' RATIO: 8 M/S
LVED V (TEICH): 83.93 ML
LVES V (TEICH): 34.44 ML
LVOT MG: 4.09 MMHG
LVOT MV: 0.96 CM/S
LYMPHOCYTES # BLD AUTO: 1.5 K/UL (ref 1–4.8)
LYMPHOCYTES NFR BLD: 12 % (ref 18–48)
MCH RBC QN AUTO: 27.3 PG (ref 27–31)
MCHC RBC AUTO-ENTMCNC: 33.1 G/DL (ref 32–36)
MCV RBC AUTO: 83 FL (ref 82–98)
MONOCYTES # BLD AUTO: 1.4 K/UL (ref 0.3–1)
MONOCYTES NFR BLD: 11.2 % (ref 4–15)
MR PISA EROA: 0.94 CM2
MV MEAN GRADIENT: 1 MMHG
MV PEAK A VEL: 0.48 M/S
MV PEAK E VEL: 0.64 M/S
MV PEAK GRADIENT: 3 MMHG
MV STENOSIS PRESSURE HALF TIME: 66.29 MS
MV VALVE AREA BY CONTINUITY EQUATION: 3.45 CM2
MV VALVE AREA P 1/2 METHOD: 3.32 CM2
NEUTROPHILS # BLD AUTO: 9.6 K/UL (ref 1.8–7.7)
NEUTROPHILS NFR BLD: 76.1 % (ref 38–73)
NONHDLC SERPL-MCNC: 91 MG/DL
NRBC BLD-RTO: 0 /100 WBC
OHS CV RV/LV RATIO: 0.72 CM
OHS QRS DURATION: 98 MS
OHS QTC CALCULATION: 452 MS
PISA MRMAX VEL: 5.36 M/S
PISA RADIUS: 1.61 CM
PISA TR MAX VEL: 3.15 M/S
PISA VN NYQUIST MS: 0.31 M/S
PISA VN NYQUIST: 0.31 M/S
PLATELET # BLD AUTO: 179 K/UL (ref 150–450)
PMV BLD AUTO: 11 FL (ref 9.2–12.9)
POCT GLUCOSE: 106 MG/DL (ref 70–110)
POCT GLUCOSE: 166 MG/DL (ref 70–110)
POCT GLUCOSE: 172 MG/DL (ref 70–110)
POCT GLUCOSE: 195 MG/DL (ref 70–110)
POCT GLUCOSE: 237 MG/DL (ref 70–110)
POTASSIUM SERPL-SCNC: 4.3 MMOL/L (ref 3.5–5.1)
PROT SERPL-MCNC: 6.2 G/DL (ref 6–8.4)
PULM VEIN S/D RATIO: 1.02
PV MV: 0.95 M/S
PV PEAK D VEL: 0.55 M/S
PV PEAK GRADIENT: 7 MMHG
PV PEAK S VEL: 0.56 M/S
PV PEAK VELOCITY: 1.32 M/S
RA PRESSURE ESTIMATED: 3 MMHG
RA VOL SYS: 32.26 ML
RBC # BLD AUTO: 3.37 M/UL (ref 4.6–6.2)
RIGHT ATRIAL AREA: 14.2 CM2
RIGHT ATRIUM VOLUME AREA LENGTH APICAL 4 CHAMBER: 31.76 ML
RIGHT VENTRICLE DIASTOLIC BASEL DIMENSION: 3.1 CM
RV TB RVSP: 6 MMHG
RV TISSUE DOPPLER FREE WALL SYSTOLIC VELOCITY 1 (APICAL 4 CHAMBER VIEW): 15.55 CM/S
SINUS: 3.22 CM
SODIUM SERPL-SCNC: 136 MMOL/L (ref 136–145)
STJ: 2.82 CM
TDI LATERAL: 0.08 M/S
TDI SEPTAL: 0.08 M/S
TDI: 0.08 M/S
TR MAX PG: 40 MMHG
TRICUSPID ANNULAR PLANE SYSTOLIC EXCURSION: 2.17 CM
TRIGL SERPL-MCNC: 88 MG/DL (ref 30–150)
TV REST PULMONARY ARTERY PRESSURE: 43 MMHG
WBC # BLD AUTO: 12.63 K/UL (ref 3.9–12.7)
Z-SCORE OF LEFT VENTRICULAR DIMENSION IN END DIASTOLE: -1.92
Z-SCORE OF LEFT VENTRICULAR DIMENSION IN END SYSTOLE: -0.55

## 2024-12-09 PROCEDURE — 85025 COMPLETE CBC W/AUTO DIFF WBC: CPT | Performed by: INTERNAL MEDICINE

## 2024-12-09 PROCEDURE — 85730 THROMBOPLASTIN TIME PARTIAL: CPT | Performed by: INTERNAL MEDICINE

## 2024-12-09 PROCEDURE — 80053 COMPREHEN METABOLIC PANEL: CPT | Performed by: INTERNAL MEDICINE

## 2024-12-09 PROCEDURE — 99900035 HC TECH TIME PER 15 MIN (STAT)

## 2024-12-09 PROCEDURE — 99291 CRITICAL CARE FIRST HOUR: CPT | Mod: ,,, | Performed by: INTERNAL MEDICINE

## 2024-12-09 PROCEDURE — 63600175 PHARM REV CODE 636 W HCPCS: Performed by: INTERNAL MEDICINE

## 2024-12-09 PROCEDURE — 11000001 HC ACUTE MED/SURG PRIVATE ROOM

## 2024-12-09 PROCEDURE — 80061 LIPID PANEL: CPT | Performed by: INTERNAL MEDICINE

## 2024-12-09 PROCEDURE — 94761 N-INVAS EAR/PLS OXIMETRY MLT: CPT

## 2024-12-09 PROCEDURE — 25000003 PHARM REV CODE 250: Performed by: INTERNAL MEDICINE

## 2024-12-09 RX ORDER — ENOXAPARIN SODIUM 100 MG/ML
40 INJECTION SUBCUTANEOUS EVERY 24 HOURS
Status: DISCONTINUED | OUTPATIENT
Start: 2024-12-09 | End: 2024-12-10 | Stop reason: HOSPADM

## 2024-12-09 RX ADMIN — ENOXAPARIN SODIUM 40 MG: 40 INJECTION SUBCUTANEOUS at 05:12

## 2024-12-09 RX ADMIN — MUPIROCIN: 20 OINTMENT TOPICAL at 08:12

## 2024-12-09 RX ADMIN — METOPROLOL SUCCINATE 12.5 MG: 25 TABLET, EXTENDED RELEASE ORAL at 08:12

## 2024-12-09 RX ADMIN — INSULIN ASPART 10 UNITS: 100 INJECTION, SOLUTION INTRAVENOUS; SUBCUTANEOUS at 07:12

## 2024-12-09 RX ADMIN — INSULIN ASPART 10 UNITS: 100 INJECTION, SOLUTION INTRAVENOUS; SUBCUTANEOUS at 12:12

## 2024-12-09 RX ADMIN — TICAGRELOR 90 MG: 90 TABLET ORAL at 08:12

## 2024-12-09 RX ADMIN — INSULIN ASPART 2 UNITS: 100 INJECTION, SOLUTION INTRAVENOUS; SUBCUTANEOUS at 07:12

## 2024-12-09 RX ADMIN — INSULIN GLARGINE 20 UNITS: 100 INJECTION, SOLUTION SUBCUTANEOUS at 08:12

## 2024-12-09 RX ADMIN — INSULIN ASPART 2 UNITS: 100 INJECTION, SOLUTION INTRAVENOUS; SUBCUTANEOUS at 12:12

## 2024-12-09 RX ADMIN — INSULIN ASPART 10 UNITS: 100 INJECTION, SOLUTION INTRAVENOUS; SUBCUTANEOUS at 05:12

## 2024-12-09 RX ADMIN — ATORVASTATIN CALCIUM 40 MG: 40 TABLET, FILM COATED ORAL at 08:12

## 2024-12-09 RX ADMIN — PANTOPRAZOLE SODIUM 40 MG: 40 TABLET, DELAYED RELEASE ORAL at 08:12

## 2024-12-09 RX ADMIN — FUROSEMIDE 20 MG: 20 TABLET ORAL at 08:12

## 2024-12-09 RX ADMIN — LOSARTAN POTASSIUM 12.5 MG: 25 TABLET, FILM COATED ORAL at 10:12

## 2024-12-09 RX ADMIN — ASPIRIN 81 MG: 81 TABLET, COATED ORAL at 08:12

## 2024-12-09 NOTE — NURSING
Care Plan    POC reviewed with Rigoberto Quintana and family. IABP/venous sheath/rey removed. Amio/heparin gtts discontinued. Continuing diuresis. R/L groin sites w/out hematoma/oozing. Pt denies SOB/chest pain, now on room air. Pt able to sit up at 1825. Insulin regimen modified. Questions and concerns addressed. No acute events today. Pt progressing toward goals. Will continue to monitor. See below and flowsheets for full assessment and VS info.       Neuro:  Amanda Coma Scale  Best Eye Response: 4-->(E4) spontaneous  Best Motor Response: 6-->(M6) obeys commands  Best Verbal Response: 5-->(V5) oriented  Amanda Coma Scale Score: 15  Assessment Qualifiers: patient not sedated/intubated, no eye obstruction present  Pupil PERRLA: yes  24 hr Temp:  [99.2 °F (37.3 °C)-100 °F (37.8 °C)]      CV:  Rhythm: normal sinus rhythm  DVT prophylaxis: VTE Core Measure: Pharmacological prophylaxis initiated/maintained    Resp:     Oxygen Concentration (%): 60    GI/:  GI prophylaxis: yes  Diet/Nutrition Received: consistent carb/diabetic diet  Last Bowel Movement: 12/06/24  Voiding Characteristics: urethral catheter (bladder)  [REMOVED]      Urethral Catheter 12/06/24 2040 Double-lumen 16 Fr.-Reason for Continuing Urinary Catheterization: Critically ill in ICU and requiring hourly monitoring of intake/output   Intake/Output Summary (Last 24 hours) at 12/8/2024 1815  Last data filed at 12/8/2024 1748  Gross per 24 hour   Intake 748.66 ml   Output 1822 ml   Net -1073.34 ml       Labs/Accuchecks:  Recent Labs   Lab 12/08/24  1735   WBC 15.23*   RBC 3.56*   HGB 9.7*   HCT 29.8*         Recent Labs   Lab 12/08/24  1735      K 4.6   CO2 25      BUN 23   CREATININE 1.2   ALKPHOS 47   ALT 51*   *   BILITOT 0.5      Recent Labs   Lab 12/06/24  1658 12/06/24 2008 12/08/24  1042   INR 1.0  --   --    APTT 24.2   < > 25.1    < > = values in this interval not displayed.      Recent Labs   Lab 12/06/24 1658    TROPONINI 0.149*       Electrolytes: Electrolytes replaced  Accuchecks: ACHS    Gtts/LDAs:   NORepinephrine bitartrate-D5W    Continuous PRN   Stopped at 12/06/24 1950    tirofiban-0.9% sodium chloride 12.5 mg/250ml    Continuous PRN   Stopped at 12/06/24 2000       Lines/Drains/Airways       Peripheral Intravenous Line  Duration                  Peripheral IV - Single Lumen 12/06/24 1653 18 G Right Forearm 2 days         Peripheral IV - Single Lumen 12/06/24 1654 18 G Left Antecubital 2 days         Peripheral IV - Single Lumen 12/07/24 0858 20 G Anterior;Proximal;Right Forearm 1 day                    Skin/Wounds  Bathing/Skin Care: dressed/undressed (12/07/24 1800)  Wounds: No  Wound care consulted: No    Consults  Consults (From admission, onward)          Status Ordering Provider     Inpatient consult to [facility]  Once        Provider:  Daniel Decker MD    Completed OTONIEL SMITH

## 2024-12-09 NOTE — TELEPHONE ENCOUNTER
----- Message from Ronel sent at 12/9/2024  2:34 PM CST -----  Regarding: HFU  Patient will be discharging from [facility] and a HFU was requested with Cardiology by DC provider.  Please schedule and message me back so DC can relay appointment information to patient prior to discharge.     Dr Quiroz performed a procedure on patient.    DX: STEMI    Thanks, Alexandria  Physician Referral Specialist/Discharge

## 2024-12-09 NOTE — ASSESSMENT & PLAN NOTE
Cardiology is a primary team  Status post left main RIAN, weaning of bone-on-bone  -weaned off BiPAP and to nasal cannula now into room air doing well  -managed by cardiology balloon pump was removed on 12/08.

## 2024-12-09 NOTE — PLAN OF CARE
went to meet with patient in ICU. Patient's spouse at bedside. Patient is independent and lives with his wife at home. He does not use any DME (besides a glucometer) or have Home Health. He still drives, but family will transport home at discharge. Patient's Cardiologist is at , but he would prefer to follow-up with Dr. Quiroz. PCP and Cardiology follow-up appointments requested from access navigator. Patient reports MD will be ordering Cardiac Rehab as well. Patient and Spouse encouraged to call with any questions or concerns.   will continue to follow patient through transitions of care and assist with any discharge needs.     Other Contacts    Name Relation Home Work Mobile   Marilou Quintana Spouse [phone]   Chapis Quintana Mother [phone]     Brittany Carson Daughter [phone]       Future Appointments   Date Time Provider Department Center   1/3/2025  1:40 PM Dominic Mahan, [facility] CARDIO Noé Parekh Jr., NP  Family Medicine [phone] [address] [place] [id number]      Next Steps: Follow up on 12/16/2024  Instructions: at 9:30 am NOTE:  Patient's PCP at [facility] does not have availaibiity until January so they scheduled patient at [facility] for a sooner appt. [address] [address] PH:  [phone] NOTE:  FAX Hospital Notes, labs and images  to FAX: [phone] Patient needs to bring in photo ID, and Medicine bottles.      12/09/24 7164   Discharge Assessment   Assessment Type Discharge Planning Assessment   Confirmed/corrected address, phone number and insurance Yes   Confirmed Demographics Correct on Facesheet   Source of Information patient;family;health record   People in Home significant other;spouse   Facility Arrived From: Home   Do you expect to return to your current living situation? Yes   Do you have help at home or someone to help you manage your care at home? Yes   Who are  your caregiver(s) and their phone number(s)? Marilou Quintana Spouse [phone]   Prior to hospitilization cognitive status: Alert/Oriented   Current cognitive status: Alert/Oriented   Walking or Climbing Stairs Difficulty no   Dressing/Bathing Difficulty no   Equipment Currently Used at Home none   Do you take prescription medications? Yes   Do you have prescription coverage? Yes   Do you have any problems affording any of your prescribed medications? No   Is the patient taking medications as prescribed? yes   How do you get to doctors appointments? car, drives self   Are you on dialysis? No   Do you take coumadin? No   Discharge Plan A Home   Discharge Plan B Home with family;Home Health   DME Needed Upon Discharge  glucometer   Discharge Plan discussed with: Spouse/sig other;Patient   Name(s) and Number(s) Marilou Quintana Spouse [phone]   Transition of Care Barriers None   Physical Activity   On average, how many days per week do you engage in moderate to strenuous exercise (like a brisk walk)? Pt Declined   On average, how many minutes do you engage in exercise at this level? Pt Declined   Financial Resource Strain   How hard is it for you to pay for the very basics like food, housing, medical care, and heating? Not hard   Housing Stability   In the last 12 months, was there a time when you were not able to pay the mortgage or rent on time? N   At any time in the past 12 months, were you homeless or living in a shelter (including now)? N   Transportation Needs   Has the lack of transportation kept you from medical appointments, meetings, work or from getting things needed for daily living? No   Food Insecurity   Within the past 12 months, you worried that your food would run out before you got the money to buy more. Never true   Within the past 12 months, the food you bought just didn't last and you didn't have money to get more. Never true   Stress   Do you feel stress - tense,  restless, nervous, or anxious, or unable to sleep at night because your mind is troubled all the time - these days? Pt Declined   Social Isolation   How often do you feel lonely or isolated from those around you?  Patient declined   Alcohol Use   Q1: How often do you have a drink containing alcohol? Pt Declined   Q2: How many drinks containing alcohol do you have on a typical day when you are drinking? Pt Declined   Q3: How often do you have six or more drinks on one occasion? Pt Declined   Utilities   In the past 12 months has the electric, gas, oil, or water company threatened to shut off services in your home? No   Health Literacy   How often do you need to have someone help you when you read instructions, pamphlets, or other written material from your doctor or pharmacy? Never   OTHER   Name(s) of People in Home Marilou Quintana Spouse [phone]     Kirsten Fischer RN    [phone]

## 2024-12-09 NOTE — PROGRESS NOTES
[facility] Medicine  Progress Note    Patient Name: Rigoberto Quintana  MRN: [id number]  Patient Class: IP- Inpatient   Admission Date: 12/6/2024  Length of Stay: 3 days  Attending Physician: Lokehs Quiroz MD  Primary Care Provider: Erinn Morales MD        Subjective     Principal Problem:STEMI (ST elevation myocardial infarction)  Acute Condition:  STEMI/hyperglycemia        HPI:  69 years old male with past medical history of type 2 diabetes hypertension hyperlipidemia history of CAD, presented to the hospital with chest pain patient was found to have anterior wall STEMI, patient went into V-tach and got shocked and was started on amiodarone load, patient had acute hypoxic respiratory failure on initial presentation was placed on BiPAP then weaned off into nasal cannula patient also was hyperglycemic, patient was taken to cardiac catheterization urgently and was found to have anterior wall STEMI, with left main  99% occlusion status post RIAN, patient had balloon pump in place was successfully being weaned off.  Internal Medicine team was consulted for type 2 diabetes management.    Overview/Hospital Course:  No notes on file    Interval History:  Patient was seen in the morning, denies any chest pain on room air denies any nausea vomiting or diarrhe alert and oriented x4, balloon pump was removed on 12/08 at 12:30 p.m., blood glucose is better controlled.  Patient to follow up by Cardiology as primary team    Review of Systems   Constitutional:  Positive for activity change. Negative for appetite change, chills, diaphoresis, fatigue, fever and unexpected weight change.   HENT: Negative.     Eyes: Negative.    Respiratory:  Negative for apnea, cough, choking, chest tightness, shortness of breath, wheezing and stridor.    Cardiovascular:  Negative for chest pain, palpitations and leg swelling.   Gastrointestinal: Negative.    Endocrine: Negative.    Genitourinary: Negative.     Musculoskeletal: Negative.    Neurological: Negative.    Psychiatric/Behavioral: Negative.       Objective:     Vital Signs (Most Recent):  Temp: 99.8 °F (37.7 °C) (12/09/24 1545)  Pulse: 80 (12/09/24 1200)  Resp: (!) 21 (12/09/24 1200)  BP: 117/70 (12/09/24 1200)  SpO2: 97 % (12/09/24 1200) Vital Signs (24h Range):  Temp:  [99 °F (37.2 °C)-100 °F (37.8 °C)] 99.8 °F (37.7 °C)  Pulse:  [71-87] 80  Resp:  [9-37] 21  SpO2:  [92 %-100 %] 97 %  BP: ()/(51-70) 117/70     Weight: 77.6 kg (171 lb)  Body mass index is 27.6 kg/m².    Intake/Output Summary (Last 24 hours) at 12/9/2024 1556  Last data filed at 12/9/2024 0703  Gross per 24 hour   Intake --   Output 1675 ml   Net -1675 ml         Physical Exam  Constitutional:       Appearance: Normal appearance.   HENT:      Head: Normocephalic and atraumatic.      Nose: Nose normal.   Eyes:      Extraocular Movements: Extraocular movements intact.      Pupils: Pupils are equal, round, and reactive to light.   Cardiovascular:      Rate and Rhythm: Normal rate and regular rhythm.   Pulmonary:      Effort: Pulmonary effort is normal.      Breath sounds: Normal breath sounds.   Abdominal:      General: Abdomen is flat.      Palpations: Abdomen is soft.   Musculoskeletal:         General: Normal range of motion.      Cervical back: Normal range of motion.   Skin:     General: Skin is warm.      Capillary Refill: Capillary refill takes less than 2 seconds.   Neurological:      Mental Status: He is alert.   Psychiatric:         Mood and Affect: Mood normal.             Significant Labs: All pertinent labs within the past 24 hours have been reviewed.  Recent Lab Results  (Last 5 results in the past 24 hours)        12/09/24  1542   12/09/24  1212   12/09/24  1120   12/09/24  1102   12/09/24  0717        Ascending aorta       2.75         Ao peak rashi       1.7         Ao VTI       32.6         AV valve area       1.7         JEANIE by Velocity Ratio       2.3         AV mean  gradient       7.8         AV index (prosthetic)       0.59         AV peak gradient       11.6         AV Velocity Ratio       0.82         BSA       1.9         Left Ventricle Relative Wall Thickness       0.47         E/A ratio       1.33         E/E' ratio       8.00         E wave deceleration time       228.58         FS       30.2         IVC diameter       1.48         IVSd       0.8         LA area A2C       19.66         LA area A4C       16.72         LA Vol       53.41         PATRICK (MOD)       28.6         LVOT area       2.8         LV LATERAL E/E' RATIO       8.00         LV SEPTAL E/E' RATIO       8.00         LV EDV BP       83.93         LV Diastolic Volume Index       44.88         Left Ventricular End Diastolic Volume by Teichholz Method       83.93         Left Ventricular End Systolic Volume by Teichholz Method       34.44         LV ESV A2C       56.32         LV ESV A4C       45.88         LVIDd       4.3         LVIDs       3.0         LV mass       123.3         LV Mass Index       65.9         Left Ventricular Outflow Tract Mean Gradient       4.09         Left Ventricular Outflow Tract Mean Velocity       0.96         LVOT diameter       1.9         LVOT peak rashi       1.4         LVOT stroke volume       54.1         LVOT peak VTI       19.1         LV ESV BP       34.44         LV Systolic Volume Index       18.4         Mean e'       0.08         Mr max rashi       5.36         MR PISA EROA       0.94         MV valve area p 1/2 method       3.32         MV valve area by continuity eq       3.45         MV mean gradient       1         MV peak gradient       3         MV Peak A Rashi       0.48         MV Peak E Rashi       0.64         MV stenosis pressure 1/2 time       66.29         MV VTI       15.7         Radius       1.61         POCT Glucose 106   195   237     166       Pulmonary Valve Mean Velocity       0.95         PV peak gradient       7         PV Peak D Rashi       0.55          PV Peak S Hima       0.56         PV PEAK VELOCITY       1.32         Pulm vein S/D ratio       1.02         PW       1.0         RA Vol       32.26         RA Area       14.2         Est. RA pres       3         RA area length vol       31.76         RV S'       15.55         RV TB RVSP       6         RV/LV Ratio       0.72         RV- richardson basal diam       3.1         Sinus       3.22         STJ       2.82         TAPSE       2.17         TDI SEPTAL       0.08         TDI LATERAL       0.08         Triscuspid Valve Regurgitation Peak Gradient       40         TR Max Hima       3.15         TV resting pulmonary artery pressure       43         Vn Nyquist       0.31         Vn Nyquist MS       0.31         ZLVIDD       -1.92         ZLVIDS       -0.55                                Significant Imaging: I have reviewed all pertinent imaging results/findings within the past 24 hours.    Assessment and Plan     * STEMI (ST elevation myocardial infarction)  Cardiology is a primary team  Status post left main RIAN, weaning of bone-on-bone  -weaned off BiPAP and to nasal cannula now into room air doing well  -managed by cardiology balloon pump was removed on 12/08.    Diabetes mellitus due to underlying condition without complications  Patient's FSGs are controlled on current medication regimen.  Last A1c reviewed-   Lab Results   Component Value Date    HGBA1C 7.3 (H) 12/07/2024     Most recent fingerstick glucose reviewed-   Recent Labs   Lab 12/09/24  0717 12/09/24  1120 12/09/24  1212 12/09/24  1542   POCTGLUCOSE 166* 237* 195* 106       Current correctional scale  Medium  Increase anti-hyperglycemic dose as follows-   Antihyperglycemics (From admission, onward)      Start     Stop Route Frequency Ordered    12/09/24 0900  insulin glargine U-100 (Lantus) pen 20 Units         -- SubQ Daily 12/08/24 0842    12/08/24 1130  insulin aspart U-100 pen 10 Units         -- SubQ 3 times daily with meals 12/08/24 0842    12/07/24  1038  insulin aspart U-100 pen 0-10 Units         -- SubQ Before meals & nightly PRN 12/07/24 0939    12/07/24 1013  insulin aspart U-100 pen 0-10 Units         -- SubQ Before meals & nightly PRN 12/07/24 0914          Hold Oral hypoglycemics while patient is in the hospital.    -12/8 blood glucose is still above 200 goal of 140-180, insulin was adjusted 20 unit long-acting plus t.i.d. short-acting with sliding scale and hypoglycemic protocol on board.    Benign essential HTN  Patient's blood pressure range in the last 24 hours was: BP  Min: 97/49  Max: 139/65.The patient's inpatient anti-hypertensive regimen is listed below:  Current Antihypertensives  furosemide injection 20 mg, Every 12 hours, Intravenous  metoprolol succinate (TOPROL-XL) 24 hr tablet 25 mg, Daily, Oral    Plan  - BP is controlled, no changes needed to their regimen  - controlled by Cardiology team    Hyperlipidemia with target LDL less than 130    High-intensity statin  -recommend ordering lipid panel since last one  was from 2017      VTE Risk Mitigation (From admission, onward)           Ordered     enoxaparin injection 40 mg  Daily         12/09/24 0824     IP VTE HIGH RISK PATIENT  Once         12/09/24 0824                    Discharge Planning   TONYA:      Code Status: Full Code   Medical Readiness for Discharge Date:   Discharge Plan A: Home            Critical care time spent on the evaluation and treatment of severe organ dysfunction, review of pertinent labs and imaging studies, discussions with consulting providers and discussions with patient/family: 45 minutes.            Daniel Gusman MD  Department of Hospital Medicine   [facility] - Intensive Care

## 2024-12-09 NOTE — SUBJECTIVE & OBJECTIVE
Interval History:  Patient was seen in the morning, denies any chest pain on room air denies any nausea vomiting or diarrhe alert and oriented x4, balloon pump was removed on 12/08 at 12:30 p.m., blood glucose is better controlled.  Patient to follow up by Cardiology as primary team    Review of Systems   Constitutional:  Positive for activity change. Negative for appetite change, chills, diaphoresis, fatigue, fever and unexpected weight change.   HENT: Negative.     Eyes: Negative.    Respiratory:  Negative for apnea, cough, choking, chest tightness, shortness of breath, wheezing and stridor.    Cardiovascular:  Negative for chest pain, palpitations and leg swelling.   Gastrointestinal: Negative.    Endocrine: Negative.    Genitourinary: Negative.    Musculoskeletal: Negative.    Neurological: Negative.    Psychiatric/Behavioral: Negative.       Objective:     Vital Signs (Most Recent):  Temp: 99.8 °F (37.7 °C) (12/09/24 1545)  Pulse: 80 (12/09/24 1200)  Resp: (!) 21 (12/09/24 1200)  BP: 117/70 (12/09/24 1200)  SpO2: 97 % (12/09/24 1200) Vital Signs (24h Range):  Temp:  [99 °F (37.2 °C)-100 °F (37.8 °C)] 99.8 °F (37.7 °C)  Pulse:  [71-87] 80  Resp:  [9-37] 21  SpO2:  [92 %-100 %] 97 %  BP: ()/(51-70) 117/70     Weight: 77.6 kg (171 lb)  Body mass index is 27.6 kg/m².    Intake/Output Summary (Last 24 hours) at 12/9/2024 1556  Last data filed at 12/9/2024 0703  Gross per 24 hour   Intake --   Output 1675 ml   Net -1675 ml         Physical Exam  Constitutional:       Appearance: Normal appearance.   HENT:      Head: Normocephalic and atraumatic.      Nose: Nose normal.   Eyes:      Extraocular Movements: Extraocular movements intact.      Pupils: Pupils are equal, round, and reactive to light.   Cardiovascular:      Rate and Rhythm: Normal rate and regular rhythm.   Pulmonary:      Effort: Pulmonary effort is normal.      Breath sounds: Normal breath sounds.   Abdominal:      General: Abdomen is flat.       Palpations: Abdomen is soft.   Musculoskeletal:         General: Normal range of motion.      Cervical back: Normal range of motion.   Skin:     General: Skin is warm.      Capillary Refill: Capillary refill takes less than 2 seconds.   Neurological:      Mental Status: He is alert.   Psychiatric:         Mood and Affect: Mood normal.             Significant Labs: All pertinent labs within the past 24 hours have been reviewed.  Recent Lab Results  (Last 5 results in the past 24 hours)        12/09/24  1542   12/09/24  1212   12/09/24  1120   12/09/24  1102   12/09/24  0717        Ascending aorta       2.75         Ao peak rashi       1.7         Ao VTI       32.6         AV valve area       1.7         JEANIE by Velocity Ratio       2.3         AV mean gradient       7.8         AV index (prosthetic)       0.59         AV peak gradient       11.6         AV Velocity Ratio       0.82         BSA       1.9         Left Ventricle Relative Wall Thickness       0.47         E/A ratio       1.33         E/E' ratio       8.00         E wave deceleration time       228.58         FS       30.2         IVC diameter       1.48         IVSd       0.8         LA area A2C       19.66         LA area A4C       16.72         LA Vol       53.41         PATRICK (MOD)       28.6         LVOT area       2.8         LV LATERAL E/E' RATIO       8.00         LV SEPTAL E/E' RATIO       8.00         LV EDV BP       83.93         LV Diastolic Volume Index       44.88         Left Ventricular End Diastolic Volume by Teichholz Method       83.93         Left Ventricular End Systolic Volume by Teichholz Method       34.44         LV ESV A2C       56.32         LV ESV A4C       45.88         LVIDd       4.3         LVIDs       3.0         LV mass       123.3         LV Mass Index       65.9         Left Ventricular Outflow Tract Mean Gradient       4.09         Left Ventricular Outflow Tract Mean Velocity       0.96         LVOT diameter       1.9          LVOT peak hima       1.4         LVOT stroke volume       54.1         LVOT peak VTI       19.1         LV ESV BP       34.44         LV Systolic Volume Index       18.4         Mean e'       0.08         Mr max hima       5.36         MR PISA EROA       0.94         MV valve area p 1/2 method       3.32         MV valve area by continuity eq       3.45         MV mean gradient       1         MV peak gradient       3         MV Peak A Hima       0.48         MV Peak E Hima       0.64         MV stenosis pressure 1/2 time       66.29         MV VTI       15.7         Radius       1.61         POCT Glucose 106   195   237     166       Pulmonary Valve Mean Velocity       0.95         PV peak gradient       7         PV Peak D Hima       0.55         PV Peak S Hima       0.56         PV PEAK VELOCITY       1.32         Pulm vein S/D ratio       1.02         PW       1.0         RA Vol       32.26         RA Area       14.2         Est. RA pres       3         RA area length vol       31.76         RV S'       15.55         RV TB RVSP       6         RV/LV Ratio       0.72         RV- richardson basal diam       3.1         Sinus       3.22         STJ       2.82         TAPSE       2.17         TDI SEPTAL       0.08         TDI LATERAL       0.08         Triscuspid Valve Regurgitation Peak Gradient       40         TR Max Hima       3.15         TV resting pulmonary artery pressure       43         Vn Nyquist       0.31         Vn Nyquist MS       0.31         ZLVIDD       -1.92         ZLVIDS       -0.55                                Significant Imaging: I have reviewed all pertinent imaging results/findings within the past 24 hours.

## 2024-12-09 NOTE — PLAN OF CARE
Pt oriented x4, rested well overnight. T max 100 this shift. NSR on monitor. BP stable. Bilateral groin puncture sites without evidence of hematoma. Dressings C/D/I, no drainage. No pain reported. No evidence of bruising noted to back. O2 sat stable on RA. UO adequate via urinal. No BM overnight. BG monitored, no coverage required for HS check. Labs reviewed. POC reviewed with patient and family, verbalized understanding. Safety maintained. Care ongoing.     Problem: Adult Inpatient Plan of Care  Goal: Plan of Care Review  Outcome: Progressing  Goal: Patient-Specific Goal (Individualized)  Outcome: Progressing  Goal: Absence of Hospital-Acquired Illness or Injury  Outcome: Progressing  Goal: Optimal Comfort and Wellbeing  Outcome: Progressing  Goal: Readiness for Transition of Care  Outcome: Progressing     Problem: Wound  Goal: Optimal Coping  Outcome: Progressing  Goal: Optimal Functional Ability  Outcome: Progressing  Goal: Absence of Infection Signs and Symptoms  Outcome: Progressing  Goal: Improved Oral Intake  Outcome: Progressing  Goal: Optimal Pain Control and Function  Outcome: Progressing  Goal: Skin Health and Integrity  Outcome: Progressing  Goal: Optimal Wound Healing  Outcome: Progressing     Problem: Infection  Goal: Absence of Infection Signs and Symptoms  Outcome: Progressing     Problem: Skin Injury Risk Increased  Goal: Skin Health and Integrity  Outcome: Progressing     Problem: Fall Injury Risk  Goal: Absence of Fall and Fall-Related Injury  Outcome: Progressing     Problem: Intra-Aortic Balloon Pump  Goal: Effective, Safe Device Function  Outcome: Progressing  Goal: Absence of Infection Signs and Symptoms  Outcome: Progressing     Problem: Diabetes  Goal: Optimal Coping  Outcome: Progressing  Goal: Optimal Functional Ability  Outcome: Progressing  Goal: Blood Glucose Level Within Target Range  Outcome: Progressing  Goal: Minimize Risk of Hypoglycemia  Outcome: Progressing     Problem: Gas  Exchange Impaired  Goal: Optimal Gas Exchange  Outcome: Progressing

## 2024-12-09 NOTE — PROGRESS NOTES
[facility] - [facility]  Cardiology  Progress Note    Patient Name: Rigoberto Quintana  MRN: [id number]  Admission Date: 12/6/2024  Hospital Length of Stay: 3 days  Code Status: Full Code   Attending Physician: Lokesh Quiroz MD   Primary Care Physician: JESSICA [employer]  Expected Discharge Date:   Principal Problem:STEMI (ST elevation myocardial infarction)    Subjective:     Hospital Course:     12/7/2024 seen and examined this morning.  He is doing well.  Stable overnight.  No significant arrhythmias.  Balloon pump one-to-one weaned to 1-2 for 3 hours and he tolerated it well.   Weaned off BiPAP to nasal cannula.  Diuresing well.  Denies any chest pain.  Breathing is better.      12/8/2024:  Seen and examined this morning.  Balloon pump removed yesterday.  He is doing well.  In a good spirit.  LFTs trending down.  Hemodynamically stable.  Tele monitor reviewed without any significant arrhythmias.  Otero catheter removed.  Repeat echo done today showed improvement in the EF visually around 55%.         POST CATH NOTE     Date of Procedure: 12/6/2024      Procedure: Procedure(s) (LRB):  ANGIOGRAM, CORONARY ARTERY (N/A)  INSERTION, INTRA-AORTIC BALLOON PUMP  PTCA, Single Vessel  INSERTION, STENT, CORONARY ARTERY (N/A)  IVUS, Coronary  INSERTION, CATHETER, RIGHT HEART (Right)      Surgeons and Role:     * Lokesh Quiroz MD - Primary     Assisting Surgeon: None     Pre-Operative Diagnosis: STEMI     Post-Operative Diagnosis: Post-Op Diagnosis Codes:     * STEMI (ST elevation myocardial infarction) [I21.3]        Cath Results:  Access:  Right radial artery 5-6 Ethiopian sheath  Right common femoral artery 8 Ethiopian sheath  Left common femoral vein 7 Ethiopian sheath        LM:  JAME II flow 99% stenosis mid left main reduced to 0% post  LAD: JAME II flow ostial 50% stenosis  LCx:  JAME II flow proximal circ 40-50% stenosis  RCA: JAME III flow  Dominance right     LVgram: LVEDP 17 mmHg     Intervention:    Status post successful emergent PTCA and stenting to left main 99% stenosis with 4.5 x 16 mm RIAN post dilated with 4.5 NC balloon.  IVUS guided with excellent results.  We had to intervene on the left main as the patient was unstable with frequent PVCs and had V-tach and was shocked 3 times by EMS.  The procedure was performed with mechanical support with intra-aortic balloon pump that was placed before the coronary intervention.  He required Levophed during the procedure that was weaned off.  BiPAP was placed prior to the procedure.     2. Intra-aortic balloon pump placement for mechanical support prior to PCI     3. Right heart catheterization for cardiogenic shock                 Closure device:    Vasc band right radial artery  Right common femoral artery sheath and balloon pump secured in place  Left common femoral vein sheath sutured in place              RHC      Right heart catheterization pressures in mmHg     PCWP:  8    PA:  31   / 5   /16  RV:  37   /    /10  RA:   2     Oxygen saturations in%     PA:  46%  RA:  51%  AO  95%        Cardiac output:  6.3      l/min     Zehra  Cardiac index :   1.75     l/min     On Levophed 0.04           Assessment:          Review of Systems   Constitutional: Negative for chills and fever.   Eyes:  Negative for blurred vision.   Cardiovascular:  Negative for chest pain.   Respiratory:  Negative for cough and shortness of breath.    Gastrointestinal:  Negative for abdominal pain, nausea and vomiting.     Objective:     Vital Signs (Most Recent):  Temp: 99.6 °F (37.6 °C) (12/09/24 0703)  Pulse: 79 (12/09/24 1015)  Resp: (!) 26 (12/09/24 1015)  BP: 110/66 (12/09/24 1005)  SpO2: 98 % (12/09/24 1015) Vital Signs (24h Range):  Temp:  [99.6 °F (37.6 °C)-100 °F (37.8 °C)] 99.6 °F (37.6 °C)  Pulse:  [72-87] 79  Resp:  [9-41] 26  SpO2:  [92 %-100 %] 98 %  BP: ()/(51-75) 110/66     Weight: 77.6 kg (171 lb)  Body mass index is 27.6 kg/m².    SpO2: 98 %         Intake/Output  Summary (Last 24 hours) at 12/9/2024 1139  Last data filed at 12/9/2024 0703  Gross per 24 hour   Intake 310.19 ml   Output 1750 ml   Net -1439.81 ml       Lines/Drains/Airways       Peripheral Intravenous Line  Duration                  Peripheral IV - Single Lumen 12/07/24 0858 20 G Anterior;Proximal;Right Forearm 2 days         Peripheral IV - Single Lumen 12/08/24 2053 20 G Anterior;Left;Proximal Forearm <1 day                    Physical Exam  Constitutional:       Appearance: Normal appearance.   HENT:      Head: Normocephalic and atraumatic.   Cardiovascular:      Rate and Rhythm: Normal rate and regular rhythm.      Heart sounds: No murmur heard.  Pulmonary:      Effort: Pulmonary effort is normal. No respiratory distress.      Breath sounds: Normal breath sounds. No rales.   Musculoskeletal:      Right lower leg: No edema.      Left lower leg: No edema.   Skin:     General: Skin is warm and dry.   Neurological:      General: No focal deficit present.      Mental Status: He is alert and oriented to person, place, and time.   Psychiatric:         Behavior: Behavior normal.         Significant Labs: BMP:   Recent Labs   Lab 12/08/24  0409 12/08/24  0744 12/08/24  1735 12/09/24  0324   *  --  159* 177*     --  138 136   K 3.1*  --  4.6 4.3     --  104 103   CO2 25  --  25 23   BUN 25*  --  23 25*   CREATININE 1.2  --  1.2 1.3   CALCIUM 8.6*  --  8.7 8.6*   MG  --  1.7  --   --    , CMP   Recent Labs   Lab 12/08/24  0409 12/08/24  1735 12/09/24  0324    138 136   K 3.1* 4.6 4.3    104 103   CO2 25 25 23   * 159* 177*   BUN 25* 23 25*   CREATININE 1.2 1.2 1.3   CALCIUM 8.6* 8.7 8.6*   PROT 6.1 6.5 6.2   ALBUMIN 3.1* 3.1* 2.9*   BILITOT 0.4 0.5 0.6   ALKPHOS 46 47 43   * 116* 91*   ALT 60* 51* 45*   ANIONGAP 10 9 10   , Lipid Panel   Recent Labs   Lab 12/09/24  0324   CHOL 155   HDL 64   LDLCALC 73.4   TRIG 88   CHOLHDL 41.3   , Troponin   No results for input(s):  ""TROPONINI" in the last 48 hours.  , and All pertinent lab results from the last 24 hours have been reviewed.    Significant Imaging: Echocardiogram: 2D echo with color flow doppler: No results found for this or any previous visit. and Transthoracic echo (TTE) complete (Cupid Only):   Results for orders placed or performed during the hospital encounter of 12/06/24   Echo   Result Value Ref Range    LVOT stroke volume 54.1 cm3    LVIDd 4.3 3.5 - 6.0 cm    LV Systolic Volume 34.44 mL    LV Systolic Volume Index 18.4 mL/m2    LVIDs 3.0 2.1 - 4.0 cm    LV ESV A2C 56.32 mL    LV Diastolic Volume 83.93 mL    LV ESV A4C 45.88 mL    LV Diastolic Volume Index 44.88 mL/m2    Left Ventricular End Systolic Volume by Teichholz Method 34.44 mL    Left Ventricular End Diastolic Volume by Teichholz Method 83.93 mL    IVS 0.8 0.6 - 1.1 cm    LVOT diameter 1.9 cm    LVOT area 2.8 cm2    FS 30.2 28 - 44 %    Left Ventricle Relative Wall Thickness 0.47 cm    PW 1.0 0.6 - 1.1 cm    LV mass 123.3 g    LV Mass Index 65.9 g/m2    MV Peak E Hima 0.64 m/s    TDI LATERAL 0.08 m/s    TDI SEPTAL 0.08 m/s    E/E' ratio 8.00 m/s    MV Peak A Hima 0.48 m/s    TR Max Hima 3.15 m/s    E/A ratio 1.33     E wave deceleration time 228.58 msec    LV SEPTAL E/E' RATIO 8.00 m/s    LV LATERAL E/E' RATIO 8.00 m/s    PV Peak S Hima 0.56 m/s    PV Peak D Hima 0.55 m/s    Pulm vein S/D ratio 1.02     LVOT peak hima 1.4 m/s    Left Ventricular Outflow Tract Mean Velocity 0.96 cm/s    Left Ventricular Outflow Tract Mean Gradient 4.09 mmHg    RV- richardson basal diam 3.1 cm    RV S' 15.55 cm/s    TAPSE 2.17 cm    RV/LV Ratio 0.72 cm    LA Vol (MOD) 53.41 mL    PATRICK (MOD) 28.6 mL/m2    RA area length vol 31.76 mL    RA Area 14.2 cm2    RA Vol 32.26 mL    Vn Nyquist MS 0.31 m/s    AV mean gradient 7.8 mmHg    AV peak gradient 11.6 mmHg    Ao peak hima 1.7 m/s    Ao VTI 32.6 cm    LVOT peak VTI 19.1 cm    AV valve area 1.7 cm²    AV Velocity Ratio 0.82     AV index (prosthetic) " 0.59     JEANIE by Velocity Ratio 2.3 cm²    Radius 1.61 cm    Vn Nyquist 0.31 m/s    Mr max rashi 5.36 m/s    MR PISA EROA 0.94 cm2    MV mean gradient 1 mmHg    MV peak gradient 3 mmHg    MV stenosis pressure 1/2 time 66.29 ms    MV valve area p 1/2 method 3.32 cm2    MV valve area by continuity eq 3.45 cm2    MV VTI 15.7 cm    Triscuspid Valve Regurgitation Peak Gradient 40 mmHg    PV PEAK VELOCITY 1.32 m/s    PV peak gradient 7 mmHg    Pulmonary Valve Mean Velocity 0.95 m/s    Sinus 3.22 cm    STJ 2.82 cm    Ascending aorta 2.75 cm    IVC diameter 1.48 cm    Mean e' 0.08 m/s    ZLVIDS -0.55     ZLVIDD -1.92     LA area A4C 16.72 cm2    LA area A2C 19.66 cm2    BSA 1.9 m2     Assessment and Plan:   69 years old male presented with chest pain, cardiac arrest ( VT) post 3 shocks, EKG by EMS anterior STEMI.  Left heart catheterization with 99% occluded left main post emergent left main intervention.       Brief HPI:   1. Acute anterior wall STEMI the culprit was left main 99%.  Post emergent intervention to left main with 1 RIAN.      2. Cardiogenic shock resolved  3. Ventricular tachycardia resolved  4. Hypokalemia resolved  5. Hyperglycemia/type 2 DM controlled  6. Acute kidney injury/ resolved  7. Leukocytosis /resolved     Plan:   Status post successful intervention of the above  Aspirin and Brilinta  IABP and venous sheath removed.  Access sites good without any hematoma  Start p.o. Lasix 20 mg daily  Continue Toprol  Initiate low-dose losartan  Strict I's and O's  High-intensity statin.  We will switch to rosuvastatin at discharge  Repeat echocardiogram.  Visual EF almost back to normal.  Pending official read  Cardiac rehab phase 2 as an outpatient  Diabetes controlled.  We will initiate Jardiance at discharge.  Follow up with Endocrinology team as scheduled    Step-down from the ICU  Hopefully home within next 24-48 hours.          Critical care time was 45 minutes. Interviewing the patient, reviewing chart,  counseling and coordinating care       Active Diagnoses:    Diagnosis Date Noted POA    PRINCIPAL PROBLEM:  STEMI (ST elevation myocardial infarction) [I21.3] 12/07/2024 Yes    Diabetes mellitus due to underlying condition without complications [E08.9] 07/17/2015 Yes    Hyperlipidemia with target LDL less than 130 [E78.5] 07/17/2015 Yes    Benign essential HTN [I10] 07/17/2015 Yes      Problems Resolved During this Admission:       VTE Risk Mitigation (From admission, onward)           Ordered     enoxaparin injection 40 mg  Daily         12/09/24 0824     IP VTE HIGH RISK PATIENT  Once         12/09/24 0824                    Lokesh Quiroz MD  Cardiology  [place] - Intensive Care

## 2024-12-09 NOTE — PLAN OF CARE
Problem: Adult Inpatient Plan of Care  Goal: Optimal Comfort and Wellbeing  Outcome: Progressing     Problem: Adult Inpatient Plan of Care  Goal: Readiness for Transition of Care  Outcome: Progressing     Problem: Wound  Goal: Optimal Wound Healing  Outcome: Progressing     Problem: Infection  Goal: Absence of Infection Signs and Symptoms  Outcome: Progressing     Problem: Diabetes  Goal: Blood Glucose Level Within Target Range  Outcome: Progressing     Pt AA&O x4. NSR on monitor. HR 70s-80s. Bp stable. On RA SpO2 %. No BM, passing flatus. Received 20mg Lasix tablet, 150cc total UOP documented and 1 occurrence un-measurable. Pt up in chair. Vitals stable; pt safe and comfortable. Wife at the bedside. Report given to PM nurse.

## 2024-12-09 NOTE — ASSESSMENT & PLAN NOTE
Patient's FSGs are controlled on current medication regimen.  Last A1c reviewed-   Lab Results   Component Value Date    HGBA1C 7.3 (H) 12/07/2024     Most recent fingerstick glucose reviewed-   Recent Labs   Lab 12/09/24  0717 12/09/24  1120 12/09/24  1212 12/09/24  1542   POCTGLUCOSE 166* 237* 195* 106       Current correctional scale  Medium  Increase anti-hyperglycemic dose as follows-   Antihyperglycemics (From admission, onward)    Start     Stop Route Frequency Ordered    12/09/24 0900  insulin glargine U-100 (Lantus) pen 20 Units         -- SubQ Daily 12/08/24 0842    12/08/24 1130  insulin aspart U-100 pen 10 Units         -- SubQ 3 times daily with meals 12/08/24 0842    12/07/24 1038  insulin aspart U-100 pen 0-10 Units         -- SubQ Before meals & nightly PRN 12/07/24 0939    12/07/24 1013  insulin aspart U-100 pen 0-10 Units         -- SubQ Before meals & nightly PRN 12/07/24 0914        Hold Oral hypoglycemics while patient is in the hospital.    -12/8 blood glucose is still above 200 goal of 140-180, insulin was adjusted 20 unit long-acting plus t.i.d. short-acting with sliding scale and hypoglycemic protocol on board.

## 2024-12-10 VITALS
RESPIRATION RATE: 26 BRPM | HEART RATE: 78 BPM | WEIGHT: 171 LBS | HEIGHT: 66 IN | TEMPERATURE: 99 F | OXYGEN SATURATION: 97 % | BODY MASS INDEX: 27.48 KG/M2 | DIASTOLIC BLOOD PRESSURE: 60 MMHG | SYSTOLIC BLOOD PRESSURE: 127 MMHG

## 2024-12-10 LAB
ALBUMIN SERPL BCP-MCNC: 3.1 G/DL (ref 3.5–5.2)
ALP SERPL-CCNC: 50 U/L (ref 40–150)
ALT SERPL W/O P-5'-P-CCNC: 36 U/L (ref 10–44)
ANION GAP SERPL CALC-SCNC: 11 MMOL/L (ref 8–16)
APTT PPP: 33.8 SEC (ref 21–32)
AST SERPL-CCNC: 54 U/L (ref 10–40)
BASOPHILS # BLD AUTO: 0.05 K/UL (ref 0–0.2)
BASOPHILS NFR BLD: 0.4 % (ref 0–1.9)
BILIRUB SERPL-MCNC: 0.6 MG/DL (ref 0.1–1)
BUN SERPL-MCNC: 29 MG/DL (ref 8–23)
CALCIUM SERPL-MCNC: 9.1 MG/DL (ref 8.7–10.5)
CHLORIDE SERPL-SCNC: 103 MMOL/L (ref 95–110)
CO2 SERPL-SCNC: 21 MMOL/L (ref 23–29)
CREAT SERPL-MCNC: 1.4 MG/DL (ref 0.5–1.4)
DIFFERENTIAL METHOD BLD: ABNORMAL
EOSINOPHIL # BLD AUTO: 0 K/UL (ref 0–0.5)
EOSINOPHIL NFR BLD: 0.3 % (ref 0–8)
ERYTHROCYTE [DISTWIDTH] IN BLOOD BY AUTOMATED COUNT: 15.9 % (ref 11.5–14.5)
EST. GFR  (NO RACE VARIABLE): 54 ML/MIN/1.73 M^2
GLUCOSE SERPL-MCNC: 292 MG/DL (ref 70–110)
HCT VFR BLD AUTO: 29.1 % (ref 40–54)
HGB BLD-MCNC: 9.3 G/DL (ref 14–18)
IMM GRANULOCYTES # BLD AUTO: 0.04 K/UL (ref 0–0.04)
IMM GRANULOCYTES NFR BLD AUTO: 0.3 % (ref 0–0.5)
LACTATE SERPL-SCNC: 1.5 MMOL/L (ref 0.5–2.2)
LYMPHOCYTES # BLD AUTO: 1.6 K/UL (ref 1–4.8)
LYMPHOCYTES NFR BLD: 13.3 % (ref 18–48)
MCH RBC QN AUTO: 27 PG (ref 27–31)
MCHC RBC AUTO-ENTMCNC: 32 G/DL (ref 32–36)
MCV RBC AUTO: 84 FL (ref 82–98)
MONOCYTES # BLD AUTO: 1.4 K/UL (ref 0.3–1)
MONOCYTES NFR BLD: 11.3 % (ref 4–15)
NEUTROPHILS # BLD AUTO: 9.1 K/UL (ref 1.8–7.7)
NEUTROPHILS NFR BLD: 74.4 % (ref 38–73)
NRBC BLD-RTO: 0 /100 WBC
PLATELET # BLD AUTO: 193 K/UL (ref 150–450)
PMV BLD AUTO: 11.1 FL (ref 9.2–12.9)
POCT GLUCOSE: 137 MG/DL (ref 70–110)
POCT GLUCOSE: 174 MG/DL (ref 70–110)
POCT GLUCOSE: 354 MG/DL (ref 70–110)
POTASSIUM SERPL-SCNC: 4.7 MMOL/L (ref 3.5–5.1)
PROT SERPL-MCNC: 6.8 G/DL (ref 6–8.4)
RBC # BLD AUTO: 3.45 M/UL (ref 4.6–6.2)
SODIUM SERPL-SCNC: 135 MMOL/L (ref 136–145)
WBC # BLD AUTO: 12.21 K/UL (ref 3.9–12.7)

## 2024-12-10 PROCEDURE — G0008 ADMIN INFLUENZA VIRUS VAC: HCPCS | Performed by: INTERNAL MEDICINE

## 2024-12-10 PROCEDURE — 63600175 PHARM REV CODE 636 W HCPCS: Performed by: INTERNAL MEDICINE

## 2024-12-10 PROCEDURE — 90471 IMMUNIZATION ADMIN: CPT | Performed by: INTERNAL MEDICINE

## 2024-12-10 PROCEDURE — 80053 COMPREHEN METABOLIC PANEL: CPT | Performed by: INTERNAL MEDICINE

## 2024-12-10 PROCEDURE — 36415 COLL VENOUS BLD VENIPUNCTURE: CPT | Performed by: INTERNAL MEDICINE

## 2024-12-10 PROCEDURE — 63600175 PHARM REV CODE 636 W HCPCS

## 2024-12-10 PROCEDURE — 99239 HOSP IP/OBS DSCHRG MGMT >30: CPT | Mod: ,,, | Performed by: INTERNAL MEDICINE

## 2024-12-10 PROCEDURE — 85025 COMPLETE CBC W/AUTO DIFF WBC: CPT | Performed by: INTERNAL MEDICINE

## 2024-12-10 PROCEDURE — 25000003 PHARM REV CODE 250: Performed by: INTERNAL MEDICINE

## 2024-12-10 PROCEDURE — 85730 THROMBOPLASTIN TIME PARTIAL: CPT | Performed by: INTERNAL MEDICINE

## 2024-12-10 PROCEDURE — 99900035 HC TECH TIME PER 15 MIN (STAT)

## 2024-12-10 PROCEDURE — 90653 IIV ADJUVANT VACCINE IM: CPT | Performed by: INTERNAL MEDICINE

## 2024-12-10 PROCEDURE — 83605 ASSAY OF LACTIC ACID: CPT | Performed by: INTERNAL MEDICINE

## 2024-12-10 PROCEDURE — 94761 N-INVAS EAR/PLS OXIMETRY MLT: CPT

## 2024-12-10 PROCEDURE — 3E02340 INTRODUCTION OF INFLUENZA VACCINE INTO MUSCLE, PERCUTANEOUS APPROACH: ICD-10-PCS | Performed by: INTERNAL MEDICINE

## 2024-12-10 RX ORDER — LOSARTAN POTASSIUM 25 MG/1
12.5 TABLET ORAL DAILY
Qty: 45 TABLET | Refills: 3 | Status: SHIPPED | OUTPATIENT
Start: 2024-12-11 | End: 2025-12-11

## 2024-12-10 RX ORDER — ROSUVASTATIN CALCIUM 40 MG/1
40 TABLET, COATED ORAL NIGHTLY
Qty: 90 TABLET | Refills: 3 | Status: SHIPPED | OUTPATIENT
Start: 2024-12-10 | End: 2025-12-10

## 2024-12-10 RX ORDER — ASPIRIN 81 MG/1
81 TABLET ORAL DAILY
Qty: 90 TABLET | Refills: 3 | Status: SHIPPED | OUTPATIENT
Start: 2024-12-11 | End: 2025-12-11

## 2024-12-10 RX ORDER — METOPROLOL SUCCINATE 25 MG/1
12.5 TABLET, EXTENDED RELEASE ORAL DAILY
Qty: 45 TABLET | Refills: 3 | Status: SHIPPED | OUTPATIENT
Start: 2024-12-11 | End: 2025-12-11

## 2024-12-10 RX ORDER — FUROSEMIDE 20 MG/1
20 TABLET ORAL DAILY PRN
Qty: 30 TABLET | Refills: 11 | Status: SHIPPED | OUTPATIENT
Start: 2024-12-10 | End: 2025-12-10

## 2024-12-10 RX ADMIN — FUROSEMIDE 20 MG: 20 TABLET ORAL at 08:12

## 2024-12-10 RX ADMIN — TICAGRELOR 90 MG: 90 TABLET ORAL at 08:12

## 2024-12-10 RX ADMIN — MUPIROCIN: 20 OINTMENT TOPICAL at 08:12

## 2024-12-10 RX ADMIN — INSULIN ASPART 10 UNITS: 100 INJECTION, SOLUTION INTRAVENOUS; SUBCUTANEOUS at 11:12

## 2024-12-10 RX ADMIN — INSULIN ASPART 10 UNITS: 100 INJECTION, SOLUTION INTRAVENOUS; SUBCUTANEOUS at 08:12

## 2024-12-10 RX ADMIN — INSULIN GLARGINE 20 UNITS: 100 INJECTION, SOLUTION SUBCUTANEOUS at 09:12

## 2024-12-10 RX ADMIN — ASPIRIN 81 MG: 81 TABLET, COATED ORAL at 08:12

## 2024-12-10 RX ADMIN — METOPROLOL SUCCINATE 12.5 MG: 25 TABLET, EXTENDED RELEASE ORAL at 08:12

## 2024-12-10 RX ADMIN — PANTOPRAZOLE SODIUM 40 MG: 40 TABLET, DELAYED RELEASE ORAL at 08:12

## 2024-12-10 RX ADMIN — INSULIN ASPART 2 UNITS: 100 INJECTION, SOLUTION INTRAVENOUS; SUBCUTANEOUS at 11:12

## 2024-12-10 RX ADMIN — LOSARTAN POTASSIUM 12.5 MG: 25 TABLET, FILM COATED ORAL at 09:12

## 2024-12-10 RX ADMIN — INFLUENZA A VIRUS A/VICTORIA/4897/2022 IVR-238 (H1N1) ANTIGEN (FORMALDEHYDE INACTIVATED), INFLUENZA A VIRUS A/THAILAND/8/2022 IVR-237 (H3N2) ANTIGEN (FORMALDEHYDE INACTIVATED), INFLUENZA B VIRUS B/AUSTRIA/1359417/2021 BVR-26 ANTIGEN (FORMALDEHYDE INACTIVATED) 0.5 ML: 15; 15; 15 INJECTION, SUSPENSION INTRAMUSCULAR at 11:12

## 2024-12-10 NOTE — PLAN OF CARE
Rigoberto Quintana MRN:[id number] ([facility]#[id number]) (69 y.o. M) (Adm: 24)  [facility] ICU [id number] A  Patient Demographics    Patient Name  Rigoberto Quintana Legal Sex  Male          Age  1955 (69 y.o.) N   Address  [address]  [place] [address] Phone  [phone] (Home) *Preferred*  [phone] (Work)  [phone] (Mobile)     Patient Demographics    Address  [address]  [place] [address] Phone  [phone] (Home) *Preferred*  [phone] (Work)  [phone] (Mobile) E-mail Address  [email or web address]     PCP and Center    Primary Care Provider  Erinn Morales MD Phone  [phone] [facility]     Emergency Contacts    None on File     Other Contacts    Name Relation Home Work Mobile   Marilou Quintana Spouse [phone]   Chapis Quintana Mother [phone]     Brittany Carson Daughter [phone]       Documents on File     Status Date Received Description   Documents for the Patient   Notice of Privacy Pract Ackn Received 07/17/15 HIPAA/SELF   Insurance Documents Received 07/17/15 AETNA   Patient ID Received 07/17/15 [place] dl   Advance Directive Acknowledgement Not Received     Clinic Authorization Received () 07/17/15 CONSENT/SELF   Provider Based Acknowledgement      Miscellaneous Documents clinic   Records request for Dr. Barryleder  07/17/15   Miscellaneous Documents clinic   Records Request Dr. Rob 07/17/15   NET Outside Colonoscopy   [facility] 05/22/15 DR. ROB   Outside Misc Notes or Reports   Colonoscopy - 2004   Outside Lab   Labs - 3/4/2015   Outside Lab   Labs - 2015   Clinic Authorization Scanned Received () 17 CONSENT/SELF   Miscellaneous Documents clinic   Diabetic Eye Exam 2015   Outside Immunization   zoster immunization   Outside Immunization   Pneumococcal 23   Plain Language Summary English Yes, Printed for Patient () 01/15/16    Outside Lab   16 micro/  creatine/ HCV ab w/ reflx   Clinic Authorization Signed () 16    Plain Language Summary English No, Patient Declined Print () 16    Notice of Privacy Practice SCP Received () 16 HIPAA FORM/SELF   Advance Beneficiary Notice Not Received     Insurance Documents Received 17 AETNA INSUR CARD   Patient ID Received 17 [place] 2022   Outpatient Authorization [facility] Received () 16 OP COA/SELF   Plain Language Summary English No, Patient Declined Print () 10/14/16    Insurance Documents Received 10/14/16 aetna   Plain Language Summary English No, Patient Declined Print () 16    Insurance Documents      Plain Language Summary English No, Patient Declined Print () 17    Insurance Documents Received 17 AETNA  2017   Plain Language Summary English No, Patient Declined Print () 17    Outside Ophthalmology Reports   [facility] /Eye Exam 09/15/2017   Outside Cardiology Report   [facility] Cardiology Consultant Dr. Isidro Mcclure 2017   Notice of Privacy Practice [facility] Received 10/13/17 HIPAA FORM/SELF   Outpatient Authorization [facility] Received () 10/13/17 OP COA/SELF   [facility] Provider Based Facility Disclosure      Plain Language Summary English No, Patient Declined Print () 10/30/17 PLS   [facility] Provider Based Facility Disclosure  ()     Involvement In Care      [facility] Provider Based Facility Disclosure      Outside Correspondence   [facility] Physician Progress Note 2018   CE Attachment      COVID Vaccine Acknowledgement Signed () 10/07/21    Clinic Authorization      Consents      Clinic Authorization Signed 24    Patient ID Received 24 [place] 's License Expiration Date 2028/self   Notice of Nondiscrimination and Accessibility Signed 24 Notice of Nondiscrimination and Accessibility   Patient Rights and Responsibilities Acknowledged 24  Pt Rights and Responsibilities Acknowledgement/self   Notice of Privacy Pract Ackn      Provider Based Acknowledgement      [facility] Contracted Facility Disclosure      Plain Language Summary English      [facility] Not Contracted Facility Disclosure      Documents for the Encounter   Ambulance Record Received 12/06/24    Medicare Lifetime Reserve Form      Important Medicare Message Printed at Discharge      Advance Beneficiary Notice      Hospital Authorization      PDF Report   All Review Information   PDF Report   24 Hours without Review   Hospital Authorization Signed 12/09/24    Hospital Authorization Received 12/09/24    Important Medicare Message Received 12/09/24    Hospital Authorization Received 12/09/24    After Visit Summary   IP AVS   DICOM Study      DICOM Series      DICOM Image      DICOM Series      DICOM Image      DICOM Study  (Deleted)     DICOM Series  (Deleted)     DICOM Image  (Deleted)     DICOM Series  (Deleted)     DICOM Image  (Deleted)     DICOM Study      DICOM Series      DICOM Image      DICOM Series      DICOM Image      Study Attachment for Report   [facility] CV Echo Printable Result Report   Study Attachment for Report   CV Cardiac Cath Printable Result Report   Study Attachment for Report   CV Cardiac Cath Report   Study Attachment for Report   [facility] CV Echo Printable Result Report   MyChart Test Results Record Document   MyChart Test Results Record PDF     Admission Information    Current Information    Attending at Discharge Admitting Provider Admission Type Admission Status   Lokesh Quiroz MD Yousef, George M., MD Emergency Confirmed Discharge          Admission Date/Time Discharge Date Hospital Service Auth/Cert Status   12/06/24  1731 12/10/24 Cardiology Incomplete          Hospital Area Unit Room/Bed    [facility] ICU 5TH FLOOR [id number]/[id number] A            Discharge Disposition Discharge Destination   Home or Self Care      Admission    Complaint   STEMI     Hospital Account    Name  Acct ID Class Status Primary Coverage   Rigoberto Quintana [id number] IP- Inpatient Open AETNA MANAGED MEDICARE - AETNA MEDICARE PLAN PPO          Guarantor Account (for Hospital Account #[id number])    Name Relation to Pt Service Area Active? Acct Type   Rigoberto Quintana Hoang Self OHSSA Yes Personal/Family   Address Phone     [address] [place] [address] [phone](H)  [phone](O)            Coverage Information (for Hospital Account #[id number])    F/O Payor/Plan Precert #   AETNA MANAGED MEDICARE/AETNA MEDICARE PLAN PPO Pending Ref# [id number]   Subscriber Subscriber #   Rigoberto Quintana [id number]   Address Phone   [id number]  [place] [address] [phone]

## 2024-12-10 NOTE — NURSING
Discharge instructions and follow up appointments reviewed. Medications received from outpatient pharmacy. PIV discontinued with catheter tip intact.

## 2024-12-10 NOTE — NURSING
Patient ambulated rehman with CR monitor X7 minutes, assisted per RN x2. No c/o dizziness, chest pain or SOB. Notified Dr Quiroz.

## 2024-12-10 NOTE — TELEPHONE ENCOUNTER
----- Message from Lokesh Quiroz MD sent at 12/10/2024  7:56 AM CST -----  I would like to see him before I leave please in a week or 10 days . Schedule him with me. Ok to double book or appt at 7:40. Thanks

## 2024-12-10 NOTE — PROGRESS NOTES
[facility] Medicine  Progress Note    Patient Name: Rigoberto Quintana  MRN: [id number]  Patient Class: IP- Inpatient   Admission Date: 12/6/2024  Length of Stay: 4 days  Attending Physician: No att. providers found  Primary Care Provider: Erinn Morales MD        Subjective     Principal Problem:STEMI (ST elevation myocardial infarction)  Acute Condition:         HPI:  69 years old male with past medical history of type 2 diabetes hypertension hyperlipidemia history of CAD, presented to the hospital with chest pain patient was found to have anterior wall STEMI, patient went into V-tach and got shocked and was started on amiodarone load, patient had acute hypoxic respiratory failure on initial presentation was placed on BiPAP then weaned off into nasal cannula patient also was hyperglycemic, patient was taken to cardiac catheterization urgently and was found to have anterior wall STEMI, with left main  99% occlusion status post RIAN, patient had balloon pump in place was successfully being weaned off.  Internal Medicine team was consulted for type 2 diabetes management.    Overview/Hospital Course:  No notes on file    Interval History:    Awake and alert, sitting out in chair, no new complaint.  Mgt by cardiology  Possible discharge today    Review of Systems   Constitutional:  Positive for activity change. Negative for appetite change, chills, diaphoresis, fatigue, fever and unexpected weight change.   Respiratory:  Negative for apnea, cough, choking, chest tightness, shortness of breath, wheezing and stridor.    Cardiovascular:  Negative for chest pain, palpitations and leg swelling.     Objective:     Vital Signs (Most Recent):  Temp: 99.3 °F (37.4 °C) (12/10/24 0730)  Pulse: 78 (12/10/24 1105)  Resp: (!) 26 (12/10/24 1105)  BP: 127/60 (12/10/24 1100)  SpO2: 97 % (12/10/24 1105) Vital Signs (24h Range):  Temp:  [99 °F (37.2 °C)-99.3 °F (37.4 °C)] 99.3 °F (37.4 °C)  Pulse:  [69-86]  78  Resp:  [12-31] 26  SpO2:  [94 %-99 %] 97 %  BP: ()/(52-76) 127/60     Weight: 77.6 kg (171 lb)  Body mass index is 27.6 kg/m².    Intake/Output Summary (Last 24 hours) at 12/10/2024 1746  Last data filed at 12/10/2024 0800  Gross per 24 hour   Intake 120 ml   Output 175 ml   Net -55 ml         Physical Exam  Constitutional:       Appearance: Normal appearance.   HENT:      Head: Normocephalic and atraumatic.      Nose: Nose normal.   Eyes:      Extraocular Movements: Extraocular movements intact.      Pupils: Pupils are equal, round, and reactive to light.   Cardiovascular:      Rate and Rhythm: Normal rate and regular rhythm.   Pulmonary:      Effort: Pulmonary effort is normal.      Breath sounds: Normal breath sounds.   Abdominal:      General: Abdomen is flat.      Palpations: Abdomen is soft.   Musculoskeletal:         General: Normal range of motion.      Cervical back: Normal range of motion.   Skin:     General: Skin is warm.      Capillary Refill: Capillary refill takes less than 2 seconds.   Neurological:      Mental Status: He is alert.   Psychiatric:         Mood and Affect: Mood normal.             Significant Labs: All pertinent labs within the past 24 hours have been reviewed.  Recent Lab Results  (Last 5 results in the past 24 hours)        12/10/24  1106   12/10/24  0840   12/10/24  0822   12/10/24  0509   12/09/24  2203        Albumin     3.1           ALP     50           ALT     36           Anion Gap     11           PTT       33.8  Comment: Refer to local heparin nomogram for intensity/dose specific   therapeutic   range.  LOT^050^APTT [facility]^[id number]           AST     54           Baso #     0.05           Basophil %     0.4           BILIRUBIN TOTAL     0.6  Comment: For infants and newborns, interpretation of results should be based  on gestational age, weight and in agreement with clinical  observations.    Premature Infant recommended reference ranges:  Up to 24 hours.............<8.0  mg/dL  Up to 48 hours............<12.0 mg/dL  3-5 days..................<15.0 mg/dL  6-29 days.................<15.0 mg/dL             BUN     29           Calcium     9.1           Chloride     103           CO2     21           Creatinine     1.4           Differential Method     Automated           eGFR     54           Eos #     0.0           Eos %     0.3           Glucose     292           Gran # (ANC)     9.1           Gran %     74.4           Hematocrit     29.1           Hemoglobin     9.3           Immature Grans (Abs)     0.04  Comment: Mild elevation in immature granulocytes is non specific and   can be seen in a variety of conditions including stress response,   acute inflammation, trauma and pregnancy. Correlation with other   laboratory and clinical findings is essential.             Immature Granulocytes     0.3           Lactic Acid Level     1.5  Comment: Falsely low lactic acid results can be found in samples   containing >=13.0 mg/dL total bilirubin and/or >=3.5 mg/dL   direct bilirubin.             Lymph #     1.6           Lymph %     13.3           MCH     27.0           MCHC     32.0           MCV     84           Mono #     1.4           Mono %     11.3           MPV     11.1           nRBC     0           Platelet Count     193           POCT Glucose 174   354       137       Potassium     4.7           PROTEIN TOTAL     6.8           RBC     3.45           RDW     15.9           Sodium     135           WBC     12.21                                  Significant Imaging: I have reviewed all pertinent imaging results/findings within the past 24 hours.    Assessment and Plan     * STEMI (ST elevation myocardial infarction)  Cardiology is a primary team  Status post left main RIAN, weaning of bone-on-bone  -weaned off BiPAP and to nasal cannula now into room air doing well  -managed by cardiology balloon pump was removed on 12/08.    Diabetes mellitus due to underlying condition without  complications  Patient's FSGs are controlled on current medication regimen.  Last A1c reviewed-   Lab Results   Component Value Date    HGBA1C 7.3 (H) 12/07/2024     Most recent fingerstick glucose reviewed-   Recent Labs   Lab 12/09/24  0717 12/09/24  1120 12/09/24  1212 12/09/24  1542   POCTGLUCOSE 166* 237* 195* 106       Current correctional scale  Medium  Increase anti-hyperglycemic dose as follows-   Antihyperglycemics (From admission, onward)      Start     Stop Route Frequency Ordered    12/09/24 0900  insulin glargine U-100 (Lantus) pen 20 Units         -- SubQ Daily 12/08/24 0842    12/08/24 1130  insulin aspart U-100 pen 10 Units         -- SubQ 3 times daily with meals 12/08/24 0842    12/07/24 1038  insulin aspart U-100 pen 0-10 Units         -- SubQ Before meals & nightly PRN 12/07/24 0939    12/07/24 1013  insulin aspart U-100 pen 0-10 Units         -- SubQ Before meals & nightly PRN 12/07/24 0914          Hold Oral hypoglycemics while patient is in the hospital.    -12/8 blood glucose is still above 200 goal of 140-180, insulin was adjusted 20 unit long-acting plus t.i.d. short-acting with sliding scale and hypoglycemic protocol on board.    Benign essential HTN  Patient's blood pressure range in the last 24 hours was: BP  Min: 97/49  Max: 139/65.The patient's inpatient anti-hypertensive regimen is listed below:  Current Antihypertensives  furosemide injection 20 mg, Every 12 hours, Intravenous  metoprolol succinate (TOPROL-XL) 24 hr tablet 25 mg, Daily, Oral    Plan  - BP is controlled, no changes needed to their regimen  - controlled by Cardiology team    Hyperlipidemia with target LDL less than 130    High-intensity statin  -recommend ordering lipid panel since last one  was from 2017      VTE Risk Mitigation (From admission, onward)           Ordered     IP VTE HIGH RISK PATIENT  Once         12/09/24 0824                    Discharge Planning   TONYA: 12/10/2024     Code Status: Prior   Medical  Readiness for Discharge Date: 12/10/2024  Discharge Plan A: Home   Discharge Delays: None known at this time        Critical care time spent on the evaluation and treatment of severe organ dysfunction, review of pertinent labs and imaging studies, discussions with consulting providers and discussions with patient/family: 35 minutes.            Xenia Allison MD  Department of [facility] Medicine   [place] - Intensive Care

## 2024-12-10 NOTE — PLAN OF CARE
Problem: Adult Inpatient Plan of Care  Goal: Plan of Care Review  Outcome: Progressing  Goal: Patient-Specific Goal (Individualized)  Outcome: Progressing  Goal: Absence of Hospital-Acquired Illness or Injury  Outcome: Progressing  Goal: Optimal Comfort and Wellbeing  Outcome: Progressing  Goal: Readiness for Transition of Care  Outcome: Progressing     Problem: Wound  Goal: Optimal Coping  Outcome: Progressing  Goal: Optimal Functional Ability  Outcome: Progressing  Goal: Absence of Infection Signs and Symptoms  Outcome: Progressing  Goal: Improved Oral Intake  Outcome: Progressing  Goal: Optimal Pain Control and Function  Outcome: Progressing  Goal: Skin Health and Integrity  Outcome: Progressing  Goal: Optimal Wound Healing  Outcome: Progressing     Problem: Infection  Goal: Absence of Infection Signs and Symptoms  Outcome: Progressing     Problem: Skin Injury Risk Increased  Goal: Skin Health and Integrity  Outcome: Progressing     Problem: Fall Injury Risk  Goal: Absence of Fall and Fall-Related Injury  Outcome: Progressing     Problem: Intra-Aortic Balloon Pump  Goal: Effective, Safe Device Function  Outcome: Progressing  Goal: Absence of Infection Signs and Symptoms  Outcome: Progressing     Problem: Diabetes  Goal: Optimal Coping  Outcome: Progressing  Goal: Optimal Functional Ability  Outcome: Progressing  Goal: Blood Glucose Level Within Target Range  Outcome: Not Progressing  Goal: Minimize Risk of Hypoglycemia  Outcome: Met     Problem: Gas Exchange Impaired  Goal: Optimal Gas Exchange  Outcome: Progressing

## 2024-12-10 NOTE — SUBJECTIVE & OBJECTIVE
Interval History:    Awake and alert, sitting out in chair, no new complaint.  Mgt by cardiology  Possible discharge today    Review of Systems   Constitutional:  Positive for activity change. Negative for appetite change, chills, diaphoresis, fatigue, fever and unexpected weight change.   Respiratory:  Negative for apnea, cough, choking, chest tightness, shortness of breath, wheezing and stridor.    Cardiovascular:  Negative for chest pain, palpitations and leg swelling.     Objective:     Vital Signs (Most Recent):  Temp: 99.3 °F (37.4 °C) (12/10/24 0730)  Pulse: 78 (12/10/24 1105)  Resp: (!) 26 (12/10/24 1105)  BP: 127/60 (12/10/24 1100)  SpO2: 97 % (12/10/24 1105) Vital Signs (24h Range):  Temp:  [99 °F (37.2 °C)-99.3 °F (37.4 °C)] 99.3 °F (37.4 °C)  Pulse:  [69-86] 78  Resp:  [12-31] 26  SpO2:  [94 %-99 %] 97 %  BP: ()/(52-76) 127/60     Weight: 77.6 kg (171 lb)  Body mass index is 27.6 kg/m².    Intake/Output Summary (Last 24 hours) at 12/10/2024 1746  Last data filed at 12/10/2024 0800  Gross per 24 hour   Intake 120 ml   Output 175 ml   Net -55 ml         Physical Exam  Constitutional:       Appearance: Normal appearance.   HENT:      Head: Normocephalic and atraumatic.      Nose: Nose normal.   Eyes:      Extraocular Movements: Extraocular movements intact.      Pupils: Pupils are equal, round, and reactive to light.   Cardiovascular:      Rate and Rhythm: Normal rate and regular rhythm.   Pulmonary:      Effort: Pulmonary effort is normal.      Breath sounds: Normal breath sounds.   Abdominal:      General: Abdomen is flat.      Palpations: Abdomen is soft.   Musculoskeletal:         General: Normal range of motion.      Cervical back: Normal range of motion.   Skin:     General: Skin is warm.      Capillary Refill: Capillary refill takes less than 2 seconds.   Neurological:      Mental Status: He is alert.   Psychiatric:         Mood and Affect: Mood normal.             Significant Labs: All  pertinent labs within the past 24 hours have been reviewed.  Recent Lab Results  (Last 5 results in the past 24 hours)        12/10/24  1106   12/10/24  0840   12/10/24  0822   12/10/24  0509   12/09/24  2203        Albumin     3.1           ALP     50           ALT     36           Anion Gap     11           PTT       33.8  Comment: Refer to local heparin nomogram for intensity/dose specific   therapeutic   range.  LOT^050^APTT [facility]^[id number]           AST     54           Baso #     0.05           Basophil %     0.4           BILIRUBIN TOTAL     0.6  Comment: For infants and newborns, interpretation of results should be based  on gestational age, weight and in agreement with clinical  observations.    Premature Infant recommended reference ranges:  Up to 24 hours.............<8.0 mg/dL  Up to 48 hours............<12.0 mg/dL  3-5 days..................<15.0 mg/dL  6-29 days.................<15.0 mg/dL             BUN     29           Calcium     9.1           Chloride     103           CO2     21           Creatinine     1.4           Differential Method     Automated           eGFR     54           Eos #     0.0           Eos %     0.3           Glucose     292           Gran # (ANC)     9.1           Gran %     74.4           Hematocrit     29.1           Hemoglobin     9.3           Immature Grans (Abs)     0.04  Comment: Mild elevation in immature granulocytes is non specific and   can be seen in a variety of conditions including stress response,   acute inflammation, trauma and pregnancy. Correlation with other   laboratory and clinical findings is essential.             Immature Granulocytes     0.3           Lactic Acid Level     1.5  Comment: Falsely low lactic acid results can be found in samples   containing >=13.0 mg/dL total bilirubin and/or >=3.5 mg/dL   direct bilirubin.             Lymph #     1.6           Lymph %     13.3           MCH     27.0           MCHC     32.0           MCV     84            Mono #     1.4           Mono %     11.3           MPV     11.1           nRBC     0           Platelet Count     193           POCT Glucose 174   354       137       Potassium     4.7           PROTEIN TOTAL     6.8           RBC     3.45           RDW     15.9           Sodium     135           WBC     12.21                                  Significant Imaging: I have reviewed all pertinent imaging results/findings within the past 24 hours.

## 2024-12-10 NOTE — PLAN OF CARE
Discharge orders noted. No DME or Home Health ordered. Cardiac Rehab ordered by Cardiology Team. PCP and Cardiology follow-up appointments scheduled. Family will transport home at discharge. No further Case Management needs.    Other Contacts    Name Relation Home Work Mobile   Marilou Quintana Spouse [phone]   Chapis Quintana Mother [phone]     Brittany Carsno Daughter [phone]       Future Appointments   Date Time Provider Department Center   12/19/2024  8:00 AM Lokesh Quiroz MD [facility] CARDIO Noé Parekh Jr., NP  Family Medicine [phone] [address] [place] [address]      Next Steps: Follow up on 12/16/2024  Instructions: at 9:30 am NOTE:  Patient's PCP at [place] does not have availability until January so they scheduled patient at [place] for a sooner appt. [facility] - [address] [address] PH:  [phone] NOTE:  FAX Hospital Notes, labs and images  to FAX: [phone] Patient needs to bring in photo ID, and Medicine bottles.        12/10/24 1055   Final Note   Assessment Type Final Discharge Note   Anticipated Discharge Disposition Home   Hospital Resources/Appts/Education Provided Appointments scheduled and added to AVS   Post-Acute Status   Discharge Delays None known at this time     Kirsten Fischer RN    [phone]

## 2024-12-12 LAB — BACTERIA BLD CULT: NORMAL

## 2024-12-17 PROBLEM — I21.01 ST ELEVATION MYOCARDIAL INFARCTION INVOLVING LEFT MAIN CORONARY ARTERY: Status: ACTIVE | Noted: 2024-12-07

## 2024-12-17 PROBLEM — Z95.5 STENTED CORONARY ARTERY: Chronic | Status: ACTIVE | Noted: 2024-12-17

## 2024-12-17 NOTE — PROGRESS NOTES
Subjective:   @Patient ID:  Rigoberto Quintana is a 69 y.o. male who presents for evaluation of coronary artery disease, history of STEMI, V-tach cardiac arrest    HPI:   12/17/2024:  Very pleasant gentleman here for follow up post hospital discharge.  Patient was admitted in 12/06/2024 with STEMI, cardiac arrest, and cardiogenic shock.  [facility] with left main critical stenosis post emergent PCI with IABP support due to cardiogenic shock.  Patient recovered very well.  EF improved.  Responded well to optimal medical therapy.  He is doing great today.  Has been walking without any chest pain.  His breathing improved significantly.  He is religiously compliant with his medications.  Blood pressure log at home is great.       PMH:   Past Medical History:   Diagnosis Date    Diabetes mellitus, type 2     Hyperlipidemia     Hypertension         SH:  No tobacco abuse    FH:  No family history of premature CAD    Prior cardiovascular  Hx  --------------------------------    - Heart Catheterization 12/06/2024  Access:  Right radial artery 5-6 [place]ian sheath  Right common femoral artery 8 [place]ian sheath  Left common femoral vein 7 [place]ian sheath        LM:  JAME II flow 99% stenosis mid left main reduced to 0% post  LAD: JAME II flow ostial 50% stenosis  LCx:  JAME II flow proximal circ 40-50% stenosis  RCA: JAME III flow  Dominance right     LVgram: LVEDP 17 mmHg     Intervention:   Status post successful emergent PTCA and stenting to left main 99% stenosis with 4.5 x 16 mm RIAN post dilated with 4.5 NC balloon.  IVUS guided with excellent results.  We had to intervene on the left main as the patient was unstable with frequent PVCs and had V-tach and was shocked 3 times by EMS.  The procedure was performed with mechanical support with intra-aortic balloon pump that was placed before the coronary intervention.  He required Levophed during the procedure that was weaned off.  BiPAP was placed prior to the procedure.     2.  Intra-aortic balloon pump placement for mechanical support prior to PCI.  Augmented pressure 123 mm Hg.  Balloon pump placed at 1:1 position confirmed with fluoro.      3. Right heart catheterization for cardiogenic shock             - ECHO 12/09/2024    Left Ventricle: The left ventricle is normal in size. There is concentric remodeling. Regional wall motion abnormalities present. See diagram for wall motion findings. There is normal systolic function with a visually estimated ejection fraction of 55 - 60%. Grade I diastolic dysfunction.    Right Ventricle: Normal right ventricular cavity size. Systolic function is normal.    Mitral Valve: There is mild regurgitation.    Tricuspid Valve: There is mild regurgitation.    Pulmonary Artery: The estimated pulmonary artery systolic pressure is 43 mmHg.    IVC/SVC: Normal venous pressure at 3 mmHg.       - EKG 12/07/2024 sinus rhythm, prolonged QT         The ASCVD Risk score (Eze WILKINS, et al., 2019) failed to calculate for the following reasons:    Risk score cannot be calculated because patient has a medical history suggesting prior/existing ASCVD      Patient Active Problem List    Diagnosis Date Noted    Stented coronary artery 12/17/2024    ST elevation myocardial infarction involving left main coronary artery 12/07/2024    Erectile dysfunction 10/09/2015    Hyperlipidemia with target LDL less than 130 07/17/2015    Benign essential HTN 07/17/2015    Diabetes mellitus due to underlying condition without complications 07/17/2015                    LAST HbA1c  Lab Results   Component Value Date    HGBA1C 7.3 (H) 12/07/2024       Lipid panel  Lab Results   Component Value Date    CHOL 155 12/09/2024    CHOL 146 10/13/2017    CHOL 167 03/01/2017     Lab Results   Component Value Date    HDL 64 12/09/2024    HDL 56 10/13/2017    HDL 58 03/01/2017     Lab Results   Component Value Date    LDLCALC 73.4 12/09/2024    LDLCALC 76.4 10/13/2017    LDLCALC 95.0 03/01/2017      Lab Results   Component Value Date    TRIG 88 12/09/2024    TRIG 68 10/13/2017    TRIG 70 03/01/2017     Lab Results   Component Value Date    CHOLHDL 41.3 12/09/2024    CHOLHDL 38.4 10/13/2017    CHOLHDL 34.7 03/01/2017            Review of Systems   Constitutional: Negative for chills and fever.   HENT:  Negative for hearing loss and nosebleeds.    Eyes:  Negative for blurred vision.   Cardiovascular:         As in HPI   Respiratory:  Negative for hemoptysis and shortness of breath.    Hematologic/Lymphatic: Negative for bleeding problem.   Skin:  Negative for itching.   Musculoskeletal:  Negative for falls.   Gastrointestinal:  Negative for abdominal pain and hematochezia.   Genitourinary:  Negative for hematuria.   Neurological:  Negative for dizziness and loss of balance.   Psychiatric/Behavioral:  Negative for altered mental status and depression.        Objective:   Physical Exam  Constitutional:       Appearance: He is well-developed.   HENT:      Head: Normocephalic and atraumatic.   Eyes:      Conjunctiva/sclera: Conjunctivae normal.   Neck:      Vascular: No carotid bruit or JVD.   Cardiovascular:      Rate and Rhythm: Normal rate and regular rhythm.      Pulses:           Carotid pulses are 2+ on the right side and 2+ on the left side.       Radial pulses are 2+ on the right side and 2+ on the left side.      Heart sounds: Normal heart sounds. No murmur heard.     No friction rub. No gallop.   Pulmonary:      Effort: Pulmonary effort is normal. No respiratory distress.      Breath sounds: Normal breath sounds. No stridor. No wheezing.   Musculoskeletal:      Cervical back: Neck supple.   Skin:     General: Skin is warm and dry.   Neurological:      Mental Status: He is alert and oriented to person, place, and time.   Psychiatric:         Behavior: Behavior normal.         Assessment:     1. ST elevation myocardial infarction involving left main coronary artery    2. Hyperlipidemia with target LDL  "less than 130    3. Benign essential HTN    4. Stented coronary artery    5. Diabetes mellitus due to underlying condition without complication, without long-term current use of insulin        Plan:   1. History of cardiac arrest/left main STEMI    Status post emergent PCI to left main IABP support.  Residual disease in the ostial LAD and circumflex moderate nonobstructive.     Continue aspirin and Brilinta for 12 months without interruption    Continue high-intensity statin.  LDL goal lower than 70.   Cardiac rehab phase 2  Continue Toprol and losartan  Lasix p.r.n.      2. Hypertension  BP well-controlled  Continue current regimen    3. Hyperlipidemia  Continue high-intensity statin  LDL goal lower than 70  Repeat lipid panel in three-months    4. Anemia  Repeat CBC    5. Type 2 DM  Continue Jardiance  Follow up with Endocrinology team  Continue Ozempic        6-8 weeks follow up or sooner p.r.n.      Pertinent cardiac images and EKG reviewed independently.    Continue with current medical plan and lifestyle changes.  Return sooner for concerns or questions. If symptoms persist go to the ED  I have reviewed all pertinent data including patient's medical history in detail and updated the computerized patient record.     No orders of the defined types were placed in this encounter.      Follow up as scheduled.     He expressed verbal understanding and agreed with the plan    Patient's Medications   New Prescriptions    No medications on file   Previous Medications    ALCOHOL SWABS (ALCOHOL PREP PADS) PADM    Test blood sugar 2 times daily and PRN    ASPIRIN (ECOTRIN) 81 MG EC TABLET    Take 1 tablet (81 mg total) by mouth once daily.    BD ULTRA-FINE SONAL PEN NEEDLES 32 GAUGE X 5/32" NDLE    USE WHICHEVER NEEDLE COMBINES WITH THE VICTOZA PEN PLEASE.( MICRO ).    BLOOD SUGAR DIAGNOSTIC STRP    Test blood sugar 2 times daily and PRN; DISP: Whichever brand is covered by insurance    BLOOD-GLUCOSE METER KIT    Test " blood sugar 2 times daily and PRN; DISP: Whichever brand is covered under insurance    EMPAGLIFLOZIN (JARDIANCE) 10 MG TABLET    Take 1 tablet (10 mg total) by mouth once daily.    FLUARIX QUAD 5672-0596, PF, 60 MCG (15 MCG X 4)/0.5 ML VACCINE    TO BE ADMINISTERED BY PHARMACIST FOR IMMUNIZATION    FUROSEMIDE (LASIX) 20 MG TABLET    Take 1 tablet (20 mg total) by mouth daily as needed (Swelling, Shortness of breath 2 pounds wt gain in 24 hours).    JANUMET -1,000 MG TM24    TAKE 1 TABLET BY MOUTH ONCE DAILY.    LANCETS MISC    Test blood sugar 2 times daily and PRN; DISP: whichever brand is covered by insurance    LOSARTAN (COZAAR) 25 MG TABLET    Take 0.5 tablets (12.5 mg total) by mouth once daily.    METFORMIN (GLUCOPHAGE-XR) 500 MG ER 24HR TABLET    Take 2,000 mg by mouth.    METOPROLOL SUCCINATE (TOPROL-XL) 25 MG 24 HR TABLET    Take 0.5 tablets (12.5 mg total) by mouth once daily.    OZEMPIC 1 MG/DOSE (4 MG/3 ML)    as directed Subcutaneous    ROSUVASTATIN (CRESTOR) 40 MG TAB    Take 1 tablet (40 mg total) by mouth every evening.    TICAGRELOR (BRILINTA) 90 MG TABLET    Take 1 tablet (90 mg total) by mouth 2 (two) times daily.    VICTOZA 2-DESTIN 0.6 MG/0.1 ML (18 MG/3 ML) PNIJ    INJECT 0.6 MG INTO THE SKIN ONCE DAILY.   Modified Medications    No medications on file   Discontinued Medications    No medications on file        Lokesh Quiroz MD, FACC, RPVI  Heart and Vascular Interventional Cardiology

## 2024-12-19 ENCOUNTER — TELEPHONE (OUTPATIENT)
Dept: CARDIOLOGY | Facility: CLINIC | Age: 69
End: 2024-12-19

## 2024-12-19 ENCOUNTER — OFFICE VISIT (OUTPATIENT)
Dept: CARDIOLOGY | Facility: CLINIC | Age: 69
End: 2024-12-19
Payer: MEDICARE

## 2024-12-19 ENCOUNTER — LAB VISIT (OUTPATIENT)
Dept: LAB | Facility: HOSPITAL | Age: 69
End: 2024-12-19
Attending: INTERNAL MEDICINE
Payer: MEDICARE

## 2024-12-19 VITALS
HEIGHT: 66 IN | BODY MASS INDEX: 26.84 KG/M2 | SYSTOLIC BLOOD PRESSURE: 113 MMHG | OXYGEN SATURATION: 99 % | WEIGHT: 167 LBS | HEART RATE: 71 BPM | DIASTOLIC BLOOD PRESSURE: 72 MMHG

## 2024-12-19 DIAGNOSIS — I21.01 ST ELEVATION MYOCARDIAL INFARCTION INVOLVING LEFT MAIN CORONARY ARTERY: ICD-10-CM

## 2024-12-19 DIAGNOSIS — Z95.5 STENTED CORONARY ARTERY: Chronic | ICD-10-CM

## 2024-12-19 DIAGNOSIS — E78.5 HYPERLIPIDEMIA WITH TARGET LDL LESS THAN 130: ICD-10-CM

## 2024-12-19 DIAGNOSIS — E08.9 DIABETES MELLITUS DUE TO UNDERLYING CONDITION WITHOUT COMPLICATION, WITHOUT LONG-TERM CURRENT USE OF INSULIN: ICD-10-CM

## 2024-12-19 DIAGNOSIS — I21.01 ST ELEVATION MYOCARDIAL INFARCTION INVOLVING LEFT MAIN CORONARY ARTERY: Primary | ICD-10-CM

## 2024-12-19 DIAGNOSIS — I10 BENIGN ESSENTIAL HTN: ICD-10-CM

## 2024-12-19 LAB
ALBUMIN SERPL BCP-MCNC: 3.4 G/DL (ref 3.5–5.2)
ALP SERPL-CCNC: 57 U/L (ref 40–150)
ALT SERPL W/O P-5'-P-CCNC: 24 U/L (ref 10–44)
ANION GAP SERPL CALC-SCNC: 8 MMOL/L (ref 8–16)
AST SERPL-CCNC: 18 U/L (ref 10–40)
BASOPHILS # BLD AUTO: 0.07 K/UL (ref 0–0.2)
BASOPHILS NFR BLD: 0.7 % (ref 0–1.9)
BILIRUB SERPL-MCNC: 0.3 MG/DL (ref 0.1–1)
BUN SERPL-MCNC: 19 MG/DL (ref 8–23)
CALCIUM SERPL-MCNC: 8.9 MG/DL (ref 8.7–10.5)
CHLORIDE SERPL-SCNC: 110 MMOL/L (ref 95–110)
CO2 SERPL-SCNC: 21 MMOL/L (ref 23–29)
CREAT SERPL-MCNC: 1.4 MG/DL (ref 0.5–1.4)
DIFFERENTIAL METHOD BLD: ABNORMAL
EOSINOPHIL # BLD AUTO: 0 K/UL (ref 0–0.5)
EOSINOPHIL NFR BLD: 0 % (ref 0–8)
ERYTHROCYTE [DISTWIDTH] IN BLOOD BY AUTOMATED COUNT: 15.7 % (ref 11.5–14.5)
EST. GFR  (NO RACE VARIABLE): 54 ML/MIN/1.73 M^2
GLUCOSE SERPL-MCNC: 184 MG/DL (ref 70–110)
HCT VFR BLD AUTO: 30.1 % (ref 40–54)
HGB BLD-MCNC: 9.3 G/DL (ref 14–18)
IMM GRANULOCYTES # BLD AUTO: 0.04 K/UL (ref 0–0.04)
IMM GRANULOCYTES NFR BLD AUTO: 0.4 % (ref 0–0.5)
LYMPHOCYTES # BLD AUTO: 1.2 K/UL (ref 1–4.8)
LYMPHOCYTES NFR BLD: 11.9 % (ref 18–48)
MCH RBC QN AUTO: 26.7 PG (ref 27–31)
MCHC RBC AUTO-ENTMCNC: 30.9 G/DL (ref 32–36)
MCV RBC AUTO: 87 FL (ref 82–98)
MONOCYTES # BLD AUTO: 0.7 K/UL (ref 0.3–1)
MONOCYTES NFR BLD: 7.1 % (ref 4–15)
NEUTROPHILS # BLD AUTO: 8.2 K/UL (ref 1.8–7.7)
NEUTROPHILS NFR BLD: 79.9 % (ref 38–73)
NRBC BLD-RTO: 0 /100 WBC
PLATELET # BLD AUTO: 325 K/UL (ref 150–450)
PMV BLD AUTO: 9.9 FL (ref 9.2–12.9)
POTASSIUM SERPL-SCNC: 3.9 MMOL/L (ref 3.5–5.1)
PROT SERPL-MCNC: 6.9 G/DL (ref 6–8.4)
RBC # BLD AUTO: 3.48 M/UL (ref 4.6–6.2)
SODIUM SERPL-SCNC: 139 MMOL/L (ref 136–145)
WBC # BLD AUTO: 10.23 K/UL (ref 3.9–12.7)

## 2024-12-19 PROCEDURE — 85025 COMPLETE CBC W/AUTO DIFF WBC: CPT | Performed by: INTERNAL MEDICINE

## 2024-12-19 PROCEDURE — 80053 COMPREHEN METABOLIC PANEL: CPT | Performed by: INTERNAL MEDICINE

## 2024-12-19 PROCEDURE — 99999 PR PBB SHADOW E&M-EST. PATIENT-LVL III: CPT | Mod: PBBFAC,,, | Performed by: INTERNAL MEDICINE

## 2024-12-19 PROCEDURE — 36415 COLL VENOUS BLD VENIPUNCTURE: CPT | Performed by: INTERNAL MEDICINE

## 2024-12-19 NOTE — TELEPHONE ENCOUNTER
----- Message from Lokesh Quiroz MD sent at 12/19/2024 10:47 AM CST -----  Blood work results are good.  Everything is stable

## 2024-12-19 NOTE — TELEPHONE ENCOUNTER
Referral message sent to [facility] to assist with scheduling patient for cardiac rehab.

## 2025-02-13 ENCOUNTER — OFFICE VISIT (OUTPATIENT)
Dept: CARDIOLOGY | Facility: CLINIC | Age: 70
End: 2025-02-13
Payer: MEDICARE

## 2025-02-13 VITALS
DIASTOLIC BLOOD PRESSURE: 70 MMHG | BODY MASS INDEX: 25.39 KG/M2 | SYSTOLIC BLOOD PRESSURE: 129 MMHG | HEART RATE: 72 BPM | HEIGHT: 66 IN | WEIGHT: 158 LBS | OXYGEN SATURATION: 98 %

## 2025-02-13 DIAGNOSIS — I25.2 HISTORY OF ST ELEVATION MYOCARDIAL INFARCTION (STEMI): Primary | Chronic | ICD-10-CM

## 2025-02-13 DIAGNOSIS — E78.5 HYPERLIPIDEMIA WITH TARGET LDL LESS THAN 130: ICD-10-CM

## 2025-02-13 DIAGNOSIS — I10 BENIGN ESSENTIAL HTN: ICD-10-CM

## 2025-02-13 DIAGNOSIS — Z95.5 STENTED CORONARY ARTERY: Chronic | ICD-10-CM

## 2025-02-13 PROCEDURE — 1101F PT FALLS ASSESS-DOCD LE1/YR: CPT | Mod: CPTII,S$GLB,, | Performed by: INTERNAL MEDICINE

## 2025-02-13 PROCEDURE — 3078F DIAST BP <80 MM HG: CPT | Mod: CPTII,S$GLB,, | Performed by: INTERNAL MEDICINE

## 2025-02-13 PROCEDURE — 3008F BODY MASS INDEX DOCD: CPT | Mod: CPTII,S$GLB,, | Performed by: INTERNAL MEDICINE

## 2025-02-13 PROCEDURE — 3074F SYST BP LT 130 MM HG: CPT | Mod: CPTII,S$GLB,, | Performed by: INTERNAL MEDICINE

## 2025-02-13 PROCEDURE — 1159F MED LIST DOCD IN RCRD: CPT | Mod: CPTII,S$GLB,, | Performed by: INTERNAL MEDICINE

## 2025-02-13 PROCEDURE — 4010F ACE/ARB THERAPY RXD/TAKEN: CPT | Mod: CPTII,S$GLB,, | Performed by: INTERNAL MEDICINE

## 2025-02-13 PROCEDURE — 1160F RVW MEDS BY RX/DR IN RCRD: CPT | Mod: CPTII,S$GLB,, | Performed by: INTERNAL MEDICINE

## 2025-02-13 PROCEDURE — 99214 OFFICE O/P EST MOD 30 MIN: CPT | Mod: S$GLB,,, | Performed by: INTERNAL MEDICINE

## 2025-02-13 PROCEDURE — 99999 PR PBB SHADOW E&M-EST. PATIENT-LVL III: CPT | Mod: PBBFAC,,, | Performed by: INTERNAL MEDICINE

## 2025-02-13 PROCEDURE — G2211 COMPLEX E/M VISIT ADD ON: HCPCS | Mod: S$GLB,,, | Performed by: INTERNAL MEDICINE

## 2025-02-13 PROCEDURE — 3288F FALL RISK ASSESSMENT DOCD: CPT | Mod: CPTII,S$GLB,, | Performed by: INTERNAL MEDICINE

## 2025-02-13 NOTE — PROGRESS NOTES
Subjective:   @Patient ID:  Rigoberto Quintana is a 70 y.o. male who presents for evaluation of coronary artery disease, history of STEMI, V-tach cardiac arrest    HPI:   2/13/2025:  Follow up.  He has been doing very well.  Started cardiac rehab and doing very well.  No significant cardiac symptoms.  Compliant with aspirin and Brilinta.  LDL at goal.  BP well-controlled    12/17/2024:  Very pleasant gentleman here for follow up post hospital discharge.  Patient was admitted in 12/06/2024 with STEMI, cardiac arrest, and cardiogenic shock.  [facility] with left main critical stenosis post emergent PCI with IABP support due to cardiogenic shock.  Patient recovered very well.  EF improved.  Responded well to optimal medical therapy.  He is doing great today.  Has been walking without any chest pain.  His breathing improved significantly.  He is religiously compliant with his medications.  Blood pressure log at home is great.       PMH:   Past Medical History:   Diagnosis Date    Diabetes mellitus, type 2     Hyperlipidemia     Hypertension         SH:  No tobacco abuse    FH:  No family history of premature CAD    Prior cardiovascular  Hx  --------------------------------    - Heart Catheterization 12/06/2024  Access:  Right radial artery 5-6 [place] sheath  Right common femoral artery 8 [place] sheath  Left common femoral vein 7 [place] sheath        LM:  JAME II flow 99% stenosis mid left main reduced to 0% post  LAD: JAME II flow ostial 50% stenosis  LCx:  JAME II flow proximal circ 40-50% stenosis  RCA: JAME III flow  Dominance right     LVgram: LVEDP 17 mmHg     Intervention:   Status post successful emergent PTCA and stenting to left main 99% stenosis with 4.5 x 16 mm RIAN post dilated with 4.5 NC balloon.  IVUS guided with excellent results.  We had to intervene on the left main as the patient was unstable with frequent PVCs and had V-tach and was shocked 3 times by EMS.  The procedure was performed with mechanical  support with intra-aortic balloon pump that was placed before the coronary intervention.  He required Levophed during the procedure that was weaned off.  BiPAP was placed prior to the procedure.     2. Intra-aortic balloon pump placement for mechanical support prior to PCI.  Augmented pressure 123 mm Hg.  Balloon pump placed at 1:1 position confirmed with fluoro.      3. Right heart catheterization for cardiogenic shock             - ECHO 12/09/2024    Left Ventricle: The left ventricle is normal in size. There is concentric remodeling. Regional wall motion abnormalities present. See diagram for wall motion findings. There is normal systolic function with a visually estimated ejection fraction of 55 - 60%. Grade I diastolic dysfunction.    Right Ventricle: Normal right ventricular cavity size. Systolic function is normal.    Mitral Valve: There is mild regurgitation.    Tricuspid Valve: There is mild regurgitation.    Pulmonary Artery: The estimated pulmonary artery systolic pressure is 43 mmHg.    IVC/SVC: Normal venous pressure at 3 mmHg.       - EKG 12/07/2024 sinus rhythm, prolonged QT         The ASCVD Risk score (Eze WILKINS, et al., 2019) failed to calculate for the following reasons:    Risk score cannot be calculated because patient has a medical history suggesting prior/existing ASCVD      Patient Active Problem List    Diagnosis Date Noted    Stented coronary artery 12/17/2024    History of ST elevation myocardial infarction (STEMI) 12/07/2024    Erectile dysfunction 10/09/2015    Hyperlipidemia with target LDL less than 130 07/17/2015    Benign essential HTN 07/17/2015    Diabetes mellitus due to underlying condition without complications 07/17/2015                    LAST HbA1c  Lab Results   Component Value Date    HGBA1C 7.3 (H) 12/07/2024       Lipid panel  Lab Results   Component Value Date    CHOL 155 12/09/2024    CHOL 146 10/13/2017    CHOL 167 03/01/2017     Lab Results   Component Value Date     HDL 64 12/09/2024    HDL 56 10/13/2017    HDL 58 03/01/2017     Lab Results   Component Value Date    LDLCALC 73.4 12/09/2024    LDLCALC 76.4 10/13/2017    LDLCALC 95.0 03/01/2017     Lab Results   Component Value Date    TRIG 88 12/09/2024    TRIG 68 10/13/2017    TRIG 70 03/01/2017     Lab Results   Component Value Date    CHOLHDL 41.3 12/09/2024    CHOLHDL 38.4 10/13/2017    CHOLHDL 34.7 03/01/2017            Review of Systems   Constitutional: Negative for chills and fever.   HENT:  Negative for hearing loss and nosebleeds.    Eyes:  Negative for blurred vision.   Cardiovascular:         As in HPI   Respiratory:  Negative for hemoptysis and shortness of breath.    Hematologic/Lymphatic: Negative for bleeding problem.   Skin:  Negative for itching.   Musculoskeletal:  Negative for falls.   Gastrointestinal:  Negative for abdominal pain and hematochezia.   Genitourinary:  Negative for hematuria.   Neurological:  Negative for dizziness and loss of balance.   Psychiatric/Behavioral:  Negative for altered mental status and depression.        Objective:   Physical Exam  Constitutional:       Appearance: He is well-developed.   HENT:      Head: Normocephalic and atraumatic.   Eyes:      Conjunctiva/sclera: Conjunctivae normal.   Neck:      Vascular: No carotid bruit or JVD.   Cardiovascular:      Rate and Rhythm: Normal rate and regular rhythm.      Pulses:           Carotid pulses are 2+ on the right side and 2+ on the left side.       Radial pulses are 2+ on the right side and 2+ on the left side.      Heart sounds: Normal heart sounds. No murmur heard.     No friction rub. No gallop.   Pulmonary:      Effort: Pulmonary effort is normal. No respiratory distress.      Breath sounds: Normal breath sounds. No stridor. No wheezing.   Musculoskeletal:      Cervical back: Neck supple.   Skin:     General: Skin is warm and dry.   Neurological:      Mental Status: He is alert and oriented to person, place, and time.    Psychiatric:         Behavior: Behavior normal.         Assessment:     1. Hyperlipidemia with target LDL less than 130    2. History of ST elevation myocardial infarction (STEMI)    3. Benign essential HTN    4. Stented coronary artery        Plan:   1. History of cardiac arrest/left main STEMI    Status post emergent PCI to left main IABP support.  Residual disease in the ostial LAD and circumflex moderate nonobstructive angiographically.  We will proceed with PET stress test to rule out any significant ischemia    Continue aspirin and Brilinta for 12 months without interruption    Continue high-intensity statin.  LDL goal lower than 70.   Cardiac rehab phase 2  Continue Toprol and losartan  Lasix p.r.n.      2. Hypertension  BP well-controlled  Continue current regimen    3. Hyperlipidemia  Continue high-intensity statin  LDL at goal.  Lab scanned in media      4. Anemia  Improved    5. Type 2 DM  Continue Jardiance  Follow up with Endocrinology team  Continue Ozempic        Six-months follow-up or sooner p.r.n.    Visit today included increased complexity associated with the care of the episodic problem coronary artery disease, hypertension, hyperlipidemia, anemia addressed and managing the longitudinal care of the patient due to the serious and/or complex managed problems     Pertinent cardiac images and EKG reviewed independently.    Continue with current medical plan and lifestyle changes.  Return sooner for concerns or questions. If symptoms persist go to the ED  I have reviewed all pertinent data including patient's medical history in detail and updated the computerized patient record.     No orders of the defined types were placed in this encounter.      Follow up as scheduled.     He expressed verbal understanding and agreed with the plan    Patient's Medications   New Prescriptions    No medications on file   Previous Medications    ALCOHOL SWABS (ALCOHOL PREP PADS) PADM    Test blood sugar 2 times  "daily and PRN    ASPIRIN (ECOTRIN) 81 MG EC TABLET    Take 1 tablet (81 mg total) by mouth once daily.    BD ULTRA-FINE SONAL PEN NEEDLES 32 GAUGE X 5/32" NDLE    USE WHICHEVER NEEDLE COMBINES WITH THE VICTOZA PEN PLEASE.( MICRO ).    BLOOD SUGAR DIAGNOSTIC STRP    Test blood sugar 2 times daily and PRN; DISP: Whichever brand is covered by insurance    BLOOD-GLUCOSE METER KIT    Test blood sugar 2 times daily and PRN; DISP: Whichever brand is covered under insurance    EMPAGLIFLOZIN (JARDIANCE) 10 MG TABLET    Take 1 tablet (10 mg total) by mouth once daily.    FLUARIX QUAD 2419-6416, PF, 60 MCG (15 MCG X 4)/0.5 ML VACCINE    TO BE ADMINISTERED BY PHARMACIST FOR IMMUNIZATION    FUROSEMIDE (LASIX) 20 MG TABLET    Take 1 tablet (20 mg total) by mouth daily as needed (Swelling, Shortness of breath 2 pounds wt gain in 24 hours).    JANUMET -1,000 MG TM24    TAKE 1 TABLET BY MOUTH ONCE DAILY.    LANCETS MISC    Test blood sugar 2 times daily and PRN; DISP: whichever brand is covered by insurance    LOSARTAN (COZAAR) 25 MG TABLET    Take 0.5 tablets (12.5 mg total) by mouth once daily.    METOPROLOL SUCCINATE (TOPROL-XL) 25 MG 24 HR TABLET    Take 0.5 tablets (12.5 mg total) by mouth once daily.    OZEMPIC 1 MG/DOSE (4 MG/3 ML)    as directed Subcutaneous    ROSUVASTATIN (CRESTOR) 40 MG TAB    Take 1 tablet (40 mg total) by mouth every evening.    TICAGRELOR (BRILINTA) 90 MG TABLET    Take 1 tablet (90 mg total) by mouth 2 (two) times daily.   Modified Medications    No medications on file   Discontinued Medications    No medications on file        Lokesh Quiroz MD, FACC, RPVI  Heart and Vascular Interventional Cardiology             "

## 2025-02-17 ENCOUNTER — TELEPHONE (OUTPATIENT)
Dept: CARDIOLOGY | Facility: CLINIC | Age: 70
End: 2025-02-17
Payer: MEDICARE

## 2025-02-17 NOTE — TELEPHONE ENCOUNTER
Received labs on patient from [facility].  Labs placed on providers desk for review.  Labs scanned into media.

## 2025-03-05 ENCOUNTER — HOSPITAL ENCOUNTER (OUTPATIENT)
Dept: CARDIOLOGY | Facility: HOSPITAL | Age: 70
Discharge: HOME | End: 2025-03-05
Attending: INTERNAL MEDICINE
Payer: MEDICARE

## 2025-03-05 ENCOUNTER — RESULTS FOLLOW-UP (OUTPATIENT)
Dept: CARDIOLOGY | Facility: CLINIC | Age: 70
End: 2025-03-05
Payer: MEDICARE

## 2025-03-05 VITALS
HEART RATE: 84 BPM | DIASTOLIC BLOOD PRESSURE: 46 MMHG | SYSTOLIC BLOOD PRESSURE: 133 MMHG | BODY MASS INDEX: 25.39 KG/M2 | WEIGHT: 158 LBS | HEIGHT: 66 IN

## 2025-03-05 DIAGNOSIS — Z95.5 STENTED CORONARY ARTERY: Chronic | ICD-10-CM

## 2025-03-05 DIAGNOSIS — I25.2 HISTORY OF ST ELEVATION MYOCARDIAL INFARCTION (STEMI): Chronic | ICD-10-CM

## 2025-03-05 LAB
CFR FLOW - ANTERIOR: 1.3
CFR FLOW - INFERIOR: 1.33
CFR FLOW - LATERAL: 1.27
CFR FLOW - MAX: 1.71
CFR FLOW - MIN: 0.89
CFR FLOW - SEPTAL: 1.32
CFR FLOW - WHOLE HEART: 1.31
CV STRESS BASE HR: 79 BPM
DIASTOLIC BLOOD PRESSURE: 67 MMHG
EJECTION FRACTION- HIGH: 59 %
END DIASTOLIC INDEX-HIGH: 155 ML/M2
END DIASTOLIC INDEX-LOW: 91 ML/M2
END SYSTOLIC INDEX-HIGH: 78 ML/M2
END SYSTOLIC INDEX-LOW: 40 ML/M2
NUC REST DIASTOLIC VOLUME INDEX: 88
NUC REST EJECTION FRACTION: 67
NUC REST SYSTOLIC VOLUME INDEX: 29
NUC STRESS DIASTOLIC VOLUME INDEX: 99
NUC STRESS EJECTION FRACTION: 71 %
NUC STRESS SYSTOLIC VOLUME INDEX: 29
OHS CV CPX 1 MINUTE RECOVERY HEART RATE: 108 BPM
OHS CV CPX 85 PERCENT MAX PREDICTED HEART RATE MALE: 126
OHS CV CPX MAX PREDICTED HEART RATE: 148
OHS CV CPX PATIENT IS FEMALE: 0
OHS CV CPX PATIENT IS MALE: 1
OHS CV CPX PEAK DIASTOLIC BLOOD PRESSURE: 46 MMHG
OHS CV CPX PEAK HEAR RATE: 84 BPM
OHS CV CPX PEAK RATE PRESSURE PRODUCT: NORMAL
OHS CV CPX PEAK SYSTOLIC BLOOD PRESSURE: 133 MMHG
OHS CV CPX PERCENT MAX PREDICTED HEART RATE ACHIEVED: 57
OHS CV CPX RATE PRESSURE PRODUCT PRESENTING: NORMAL
OHS CV INITIAL DOSE: 22.5 MCG/KG/MIN
OHS CV MODERATELY REDUCED FLOW CAPACITY: 6 %
OHS CV MYOCARDIAL STEAL: 0 %
OHS CV NO ISCHEMIA MILDLY REDUCED FLOW CAPACTY: 92 %
OHS CV NO ISCHEMIA MINIMALLY REDUCED FLOW CAPACITY: 1 %
OHS CV NON-TRANSMURAL MYOCARDIAL INFARCTION SINGLE CONTINUOUS REGION: 0 %
OHS CV NORMAL FLOW CAPACITY COMPARABLE TO HEALTHY YOUNG VOLUNTEERS: 0 %
OHS CV PEAK DOSE: 21.7 MCG/KG/MIN
OHS CV PET ID: 8289
OHS CV PRE-DOMINANTLY MYOCARDIAL SCAR: 0 %
OHS CV SEVERELY REDUCED FLOW CAPACITY LARGEST SINGLE CONTINUOUS REGION: 0 %
OHS CV SEVERELY REDUCED FLOW CAPACITY: 2 %
OHS CV TOTAL EXAM DLP: 248.39 MGY-CM
REST FLOW - ANTERIOR: 1.07 CC/MIN/G
REST FLOW - INFERIOR: 1.08 CC/MIN/G
REST FLOW - LATERAL: 1.13 CC/MIN/G
REST FLOW - MAX: 1.45 CC/MIN/G
REST FLOW - MIN: 0.66 CC/MIN/G
REST FLOW - SEPTAL: 0.93 CC/MIN/G
REST FLOW - WHOLE HEART: 1.05 CC/MIN/G
RETIRED EF AND QEF - SEE NOTES: 47 %
STRESS FLOW - ANTERIOR: 1.39 CC/MIN/G
STRESS FLOW - INFERIOR: 1.42 CC/MIN/G
STRESS FLOW - LATERAL: 1.43 CC/MIN/G
STRESS FLOW - MAX: 1.84 CC/MIN/G
STRESS FLOW - MIN: 0.6 CC/MIN/G
STRESS FLOW - SEPTAL: 1.23 CC/MIN/G
STRESS FLOW - WHOLE HEART: 1.37 CC/MIN/G
SYSTOLIC BLOOD PRESSURE: 144 MMHG

## 2025-03-05 PROCEDURE — 78431 MYOCRD IMG PET RST&STRS CT: CPT

## 2025-03-05 PROCEDURE — 63600175 PHARM REV CODE 636 W HCPCS: Performed by: INTERNAL MEDICINE

## 2025-03-05 PROCEDURE — A9555 RB82 RUBIDIUM: HCPCS | Performed by: INTERNAL MEDICINE

## 2025-03-05 RX ORDER — REGADENOSON 0.08 MG/ML
0.4 INJECTION, SOLUTION INTRAVENOUS ONCE
Status: COMPLETED | OUTPATIENT
Start: 2025-03-05 | End: 2025-03-05

## 2025-03-05 RX ORDER — AMINOPHYLLINE 25 MG/ML
75 INJECTION, SOLUTION INTRAVENOUS ONCE
Status: COMPLETED | OUTPATIENT
Start: 2025-03-05 | End: 2025-03-05

## 2025-03-05 RX ADMIN — RUBIDIUM CHLORIDE RB-82 21.7 MILLICURIE: 150 INJECTION, SOLUTION INTRAVENOUS at 09:03

## 2025-03-05 RX ADMIN — REGADENOSON 0.4 MG: 0.08 INJECTION, SOLUTION INTRAVENOUS at 09:03

## 2025-03-05 RX ADMIN — RUBIDIUM CHLORIDE RB-82 22.5 MILLICURIE: 150 INJECTION, SOLUTION INTRAVENOUS at 09:03

## 2025-03-05 RX ADMIN — AMINOPHYLLINE 75 MG: 25 INJECTION, SOLUTION INTRAVENOUS at 09:03

## 2025-03-05 NOTE — TELEPHONE ENCOUNTER
----- Message from Lokesh Quiroz MD sent at 3/5/2025 12:45 PM CST -----  Stress test result is good. No major blockage to interfere with the blood flow to the heart muscle     Sincerely,  Lokesh Quiroz MD.   Interventional Cardiologist  [facility], [place]      ----- Message -----  From: Hoang Denny III, MD  Sent: 3/5/2025  11:50 AM CST  To: Lokesh Quiroz MD

## 2025-03-05 NOTE — PROGRESS NOTES
Stress test result is good. No major blockage to interfere with the blood flow to the heart muscle     Sincerely,  Lokesh Quiroz MD.   Interventional Cardiologist  [facility], [place]

## 2025-04-02 ENCOUNTER — HOSPITAL ENCOUNTER (EMERGENCY)
Facility: HOSPITAL | Age: 70
Discharge: HOME | End: 2025-04-02
Attending: EMERGENCY MEDICINE
Payer: MEDICARE

## 2025-04-02 VITALS
RESPIRATION RATE: 16 BRPM | WEIGHT: 155 LBS | OXYGEN SATURATION: 97 % | HEART RATE: 76 BPM | TEMPERATURE: 98 F | SYSTOLIC BLOOD PRESSURE: 152 MMHG | BODY MASS INDEX: 24.91 KG/M2 | HEIGHT: 66 IN | DIASTOLIC BLOOD PRESSURE: 72 MMHG

## 2025-04-02 DIAGNOSIS — R07.9 CHEST PAIN: ICD-10-CM

## 2025-04-02 LAB
ABSOLUTE EOSINOPHIL (OHS): 0.02 K/UL
ABSOLUTE MONOCYTE (OHS): 0.82 K/UL (ref 0.3–1)
ABSOLUTE NEUTROPHIL COUNT (OHS): 7.44 K/UL (ref 1.8–7.7)
ALBUMIN SERPL BCP-MCNC: 3.8 G/DL (ref 3.5–5.2)
ALP SERPL-CCNC: 64 UNIT/L (ref 40–150)
ALT SERPL W/O P-5'-P-CCNC: 21 UNIT/L (ref 10–44)
ANION GAP (OHS): 11 MMOL/L (ref 8–16)
AST SERPL-CCNC: 22 UNIT/L (ref 11–45)
BACTERIA #/AREA URNS AUTO: NORMAL /HPF
BASOPHILS # BLD AUTO: 0.06 K/UL
BASOPHILS NFR BLD AUTO: 0.6 %
BILIRUB SERPL-MCNC: 0.2 MG/DL (ref 0.1–1)
BILIRUB UR QL STRIP.AUTO: NEGATIVE
BNP SERPL-MCNC: 54 PG/ML (ref 0–99)
BUN SERPL-MCNC: 25 MG/DL (ref 8–23)
CALCIUM SERPL-MCNC: 9.4 MG/DL (ref 8.7–10.5)
CHLORIDE SERPL-SCNC: 109 MMOL/L (ref 95–110)
CLARITY UR: CLEAR
CO2 SERPL-SCNC: 19 MMOL/L (ref 23–29)
COLOR UR AUTO: COLORLESS
CREAT SERPL-MCNC: 1.2 MG/DL (ref 0.5–1.4)
ERYTHROCYTE [DISTWIDTH] IN BLOOD BY AUTOMATED COUNT: 16 % (ref 11.5–14.5)
GFR SERPLBLD CREATININE-BSD FMLA CKD-EPI: >60 ML/MIN/1.73/M2
GLUCOSE SERPL-MCNC: 107 MG/DL (ref 70–110)
GLUCOSE UR QL STRIP: ABNORMAL
HCT VFR BLD AUTO: 35.3 % (ref 40–54)
HGB BLD-MCNC: 11 GM/DL (ref 14–18)
HGB UR QL STRIP: ABNORMAL
HYALINE CASTS UR QL AUTO: 0 /LPF (ref 0–1)
IMM GRANULOCYTES # BLD AUTO: 0.04 K/UL (ref 0–0.04)
IMM GRANULOCYTES NFR BLD AUTO: 0.4 % (ref 0–0.5)
KETONES UR QL STRIP: NEGATIVE
LEUKOCYTE ESTERASE UR QL STRIP: NEGATIVE
LYMPHOCYTES # BLD AUTO: 1.58 K/UL (ref 1–4.8)
MCH RBC QN AUTO: 26 PG (ref 27–31)
MCHC RBC AUTO-ENTMCNC: 31.2 G/DL (ref 32–36)
MCV RBC AUTO: 84 FL (ref 82–98)
MICROSCOPIC COMMENT: NORMAL
NITRITE UR QL STRIP: NEGATIVE
NUCLEATED RBC (/100WBC) (OHS): 0 /100 WBC
OHS QRS DURATION: 78 MS
OHS QTC CALCULATION: 405 MS
PH UR STRIP: 5 [PH]
PLATELET # BLD AUTO: 235 K/UL (ref 150–450)
PMV BLD AUTO: 10.4 FL (ref 9.2–12.9)
POCT GLUCOSE: 114 MG/DL (ref 70–110)
POTASSIUM SERPL-SCNC: 4.7 MMOL/L (ref 3.5–5.1)
PROT SERPL-MCNC: 8 GM/DL (ref 6–8.4)
PROT UR QL STRIP: NEGATIVE
RBC # BLD AUTO: 4.23 M/UL (ref 4.6–6.2)
RBC #/AREA URNS AUTO: 0 /HPF (ref 0–4)
RELATIVE EOSINOPHIL (OHS): 0.2 %
RELATIVE LYMPHOCYTE (OHS): 15.9 % (ref 18–48)
RELATIVE MONOCYTE (OHS): 8.2 % (ref 4–15)
RELATIVE NEUTROPHIL (OHS): 74.7 % (ref 38–73)
SODIUM SERPL-SCNC: 139 MMOL/L (ref 136–145)
SP GR UR STRIP: 1.01
TROPONIN I SERPL DL<=0.01 NG/ML-MCNC: 0.02 NG/ML
TROPONIN I SERPL DL<=0.01 NG/ML-MCNC: 0.02 NG/ML
UROBILINOGEN UR STRIP-ACNC: NEGATIVE EU/DL
WBC # BLD AUTO: 9.96 K/UL (ref 3.9–12.7)
WBC #/AREA URNS AUTO: 0 /HPF (ref 0–5)
YEAST UR QL AUTO: NORMAL /HPF

## 2025-04-02 PROCEDURE — 99285 EMERGENCY DEPT VISIT HI MDM: CPT | Mod: 25

## 2025-04-02 PROCEDURE — 93005 ELECTROCARDIOGRAM TRACING: CPT

## 2025-04-02 PROCEDURE — 25000003 PHARM REV CODE 250: Performed by: PHYSICIAN ASSISTANT

## 2025-04-02 PROCEDURE — 82962 GLUCOSE BLOOD TEST: CPT

## 2025-04-02 PROCEDURE — 85025 COMPLETE CBC W/AUTO DIFF WBC: CPT | Performed by: PHYSICIAN ASSISTANT

## 2025-04-02 PROCEDURE — 84484 ASSAY OF TROPONIN QUANT: CPT | Performed by: PHYSICIAN ASSISTANT

## 2025-04-02 PROCEDURE — 83880 ASSAY OF NATRIURETIC PEPTIDE: CPT | Performed by: PHYSICIAN ASSISTANT

## 2025-04-02 PROCEDURE — 82040 ASSAY OF SERUM ALBUMIN: CPT | Performed by: PHYSICIAN ASSISTANT

## 2025-04-02 PROCEDURE — 81001 URINALYSIS AUTO W/SCOPE: CPT | Performed by: PHYSICIAN ASSISTANT

## 2025-04-02 PROCEDURE — 93010 ELECTROCARDIOGRAM REPORT: CPT | Mod: ,,, | Performed by: INTERNAL MEDICINE

## 2025-04-02 RX ORDER — ASPIRIN 325 MG
325 TABLET ORAL
Status: COMPLETED | OUTPATIENT
Start: 2025-04-02 | End: 2025-04-02

## 2025-04-02 RX ADMIN — ASPIRIN 325 MG ORAL TABLET 325 MG: 325 PILL ORAL at 01:04

## 2025-04-02 NOTE — FIRST PROVIDER EVALUATION
Emergency Department TeleTriage Encounter Note      CHIEF COMPLAINT    Chief Complaint   Patient presents with    Referral     Referral from PCP for evaluation. Pt states diabetic and has been waking sweating. CBG in triage of 114. Hx of MI in December. Intermittent chest tightness.       VITAL SIGNS   Initial Vitals [04/02/25 1219]   BP Pulse Resp Temp SpO2   (!) 153/72 74 18 98 °F (36.7 °C) 100 %      MAP       --            ALLERGIES    Review of patient's allergies indicates:  No Known Allergies    PROVIDER TRIAGE NOTE  Patient reports some intermittent chest pain, SOB and woke with the bed soaked this morning. History of MI      ORDERS  Labs Reviewed   POCT GLUCOSE - Abnormal       Result Value    POCT Glucose 114 (*)        ED Orders (720h ago, onward)      Start Ordered     Status Ordering Provider    04/02/25 1222 04/02/25 1221  EKG 12-lead  Once         Completed by ARVIND GONZALEZ on 4/2/2025 at 12:26 PM RYAN UNDERWOOD    04/02/25 1216 04/02/25 1216  POCT glucose  Once         Final result EMERGENCY, DEPT PHYSICIAN              Virtual Visit Note: The provider triage portion of this emergency department evaluation and documentation was performed via [employer], a HIPAA-compliant telemedicine application, in concert with a tele-presenter in the room. A face to face patient evaluation with one of my colleagues will occur once the patient is placed in an emergency department room.      DISCLAIMER: This note was prepared with [employer] voice recognition transcription software. Garbled syntax, mangled pronouns, and other bizarre constructions may be attributed to that software system.

## 2025-04-02 NOTE — ED PROVIDER NOTES
"Encounter Date: 4/2/2025       History     Chief Complaint   Patient presents with    Referral     Referral from PCP for evaluation. Pt states diabetic and has been waking sweating. CBG in triage of 114. Hx of MI in December. Intermittent chest tightness.     70-year-old male, PMH mi s/p stent, DM, HTN, HLD, presents to ED with concern of "night sweats".  Patient reports waking up in the middle of the night "sweating and clammy".  He does admit during this time he had a very mild left-sided chest discomfort that lasted roughly 5-10 minutes then resolved.  He has since had no chest pain.  Denying any associated lightheadedness or dizziness, palpitations, cough, shortness of breath, URI like symptoms, fevers or chills, abdominal pain, nausea, vomiting.  Denying dysuria but has noticed a slight increase in urine frequency.  No bowel complaints.  No other acute complaints at this time    The history is provided by the patient.     Review of patient's allergies indicates:  No Known Allergies  Past Medical History:   Diagnosis Date    Diabetes mellitus, type 2     Hyperlipidemia     Hypertension      Past Surgical History:   Procedure Laterality Date    COLONOSCOPY      CORONARY ANGIOGRAPHY N/A 12/6/2024    Procedure: ANGIOGRAM, CORONARY ARTERY;  Surgeon: Lokesh Quiroz MD;  Location: [facility] CATH LAB/EP;  Service: Cardiology;  Laterality: N/A;    CORONARY STENT PLACEMENT N/A 12/6/2024    Procedure: INSERTION, STENT, CORONARY ARTERY;  Surgeon: Lokesh Quiroz MD;  Location: [facility] CATH LAB/EP;  Service: Cardiology;  Laterality: N/A;    INSERTION OF INTRA-AORTIC BALLOON ASSIST DEVICE  12/6/2024    Procedure: INSERTION, INTRA-AORTIC BALLOON PUMP;  Surgeon: Lokesh Quiroz MD;  Location: [facility] CATH LAB/EP;  Service: Cardiology;;    IVUS, CORONARY  12/6/2024    Procedure: IVUS, Coronary;  Surgeon: Lokesh Quiroz MD;  Location: [facility] CATH LAB/EP;  Service: Cardiology;;    PTCA, SINGLE VESSEL  12/6/2024    Procedure: " PTCA, Single Vessel;  Surgeon: Lokesh Quiroz MD;  Location: [facility] CATH LAB/EP;  Service: Cardiology;;    RIGHT HEART CATHETERIZATION Right 12/6/2024    Procedure: INSERTION, CATHETER, RIGHT HEART;  Surgeon: Lokesh Quiroz MD;  Location: [facility] CATH LAB/EP;  Service: Cardiology;  Laterality: Right;    rotator cuff repair 4/6/2016 Right     WISDOM TOOTH EXTRACTION       Family History   Problem Relation Name Age of Onset    Hypertension Mother      Hyperlipidemia Mother      Depression Father      Heart disease Father       Social History[1]  Review of Systems   Constitutional:  Negative for chills, fatigue and fever.   HENT:  Negative for ear pain and sore throat.    Respiratory:  Negative for cough and shortness of breath.    Cardiovascular:  Negative for chest pain, palpitations and leg swelling.   Gastrointestinal:  Negative for abdominal pain, diarrhea, nausea and vomiting.   Genitourinary:  Negative for dysuria.   Musculoskeletal:  Negative for neck pain and neck stiffness.   Neurological:  Negative for dizziness and light-headedness.       Physical Exam     Initial Vitals [04/02/25 1219]   BP Pulse Resp Temp SpO2   (!) 153/72 74 18 98 °F (36.7 °C) 100 %      MAP       --         Physical Exam    Vitals reviewed.  Constitutional: He appears well-developed and well-nourished. He is cooperative. He does not have a sickly appearance. He does not appear ill. No distress.   Very well-appearing, pleasant, no apparent distress   HENT:   Head: Normocephalic and atraumatic.   Eyes: EOM are normal.   Neck: Neck supple.   Normal range of motion.  Cardiovascular:  Normal rate, regular rhythm and normal heart sounds.           Pulmonary/Chest: Effort normal and breath sounds normal.   Abdominal: Abdomen is soft.   Musculoskeletal:         General: No tenderness.      Cervical back: Normal range of motion and neck supple.     Neurological: He is alert and oriented to person, place, and time. GCS score is 15. GCS eye  subscore is 4. GCS verbal subscore is 5. GCS motor subscore is 6.   Skin: Skin is warm and dry.   Psychiatric: He has a normal mood and affect. His speech is normal and behavior is normal. Thought content normal.         ED Course   Procedures  Labs Reviewed   COMPREHENSIVE METABOLIC PANEL - Abnormal       Result Value    Sodium 139      Potassium 4.7      Chloride 109      CO2 19 (*)     Glucose 107      BUN 25 (*)     Creatinine 1.2      Calcium 9.4      Protein Total 8.0      Albumin 3.8      Bilirubin Total 0.2      ALP 64      AST 22      ALT 21      Anion Gap 11      eGFR >60     CBC WITH DIFFERENTIAL - Abnormal    WBC 9.96      RBC 4.23 (*)     HGB 11.0 (*)     HCT 35.3 (*)     MCV 84      MCH 26.0 (*)     MCHC 31.2 (*)     RDW 16.0 (*)     Platelet Count 235      MPV 10.4      Nucleated RBC 0      Neut % 74.7 (*)     Lymph % 15.9 (*)     Mono % 8.2      Eos % 0.2      Basophil % 0.6      Imm Grans % 0.4      Neut # 7.44      Lymph # 1.58      Mono # 0.82      Eos # 0.02      Baso # 0.06      Imm Grans # 0.04     URINALYSIS, REFLEX TO URINE CULTURE - Abnormal    Color, UA Colorless (*)     Appearance, UA Clear      pH, UA 5.0      Spec Grav UA 1.015      Protein, UA Negative      Glucose, UA 4+ (*)     Ketones, UA Negative      Bilirubin, UA Negative      Blood, UA Trace (*)     Nitrites, UA Negative      Urobilinogen, UA Negative      Leukocyte Esterase, UA Negative     POCT GLUCOSE - Abnormal    POCT Glucose 114 (*)    TROPONIN I - Normal    Troponin-I 0.020     TROPONIN I - Normal    Troponin-I 0.019     B-TYPE NATRIURETIC PEPTIDE - Normal    BNP 54     CBC W/ AUTO DIFFERENTIAL    Narrative:     The following orders were created for panel order CBC auto differential.  Procedure                               Abnormality         Status                     ---------                               -----------         ------                     CBC with Differential[[id number]]       Abnormal            Final  result                 Please view results for these tests on the individual orders.   URINALYSIS MICROSCOPIC    RBC, UA 0      WBC, UA 0      Bacteria, UA Rare      Yeast, UA None      Hyaline Casts, UA 0      Microscopic Comment            ECG Results              EKG 12-lead (In process)        Collection Time Result Time QRS Duration OHS QTC Calculation    04/02/25 12:24:06 04/02/25 15:23:56 78 405                     In process by Interface, Lab In [facility] (04/02/25 15:24:03)                   Narrative:    Test Reason : R07.9,    Vent. Rate :  72 BPM     Atrial Rate :  72 BPM     P-R Int : 164 ms          QRS Dur :  78 ms      QT Int : 370 ms       P-R-T Axes :  49  75  65 degrees    QTcB Int : 405 ms    Normal sinus rhythm  Normal ECG  When compared with ECG of 05-Mar-2025 08:56,  Premature supraventricular complexes are no longer Present    Referred By: AAAREFERRAL SELF           Confirmed By:                                   Imaging Results              X-Ray Chest AP Portable (Final result)  Result time 04/02/25 13:47:47      Final result by Carloz Mclain MD (04/02/25 13:47:47)                   Impression:      No convincing evidence of acute cardiopulmonary disease.      Electronically signed by: Carloz Mclain  Date:    04/02/2025  Time:    13:47               Narrative:    EXAMINATION:  XR CHEST AP PORTABLE    CLINICAL HISTORY:  Chest Pain;    TECHNIQUE:  Single frontal view of the chest was performed.    COMPARISON:  Chest radiograph performed 12/07/2024.    FINDINGS:  Cardiac contours appear to be within normal limits.  Lungs essentially clear.  No definite pneumothorax or large volume pleural effusion.    No acute findings in the visualized abdomen.  Osseous and soft tissue structures appear without definite acute change.                                       Medications   aspirin tablet 325 mg (325 mg Oral Given 4/2/25 9197)     Medical Decision Making  Patient presents with concern of  "episode of feeling "sweaty and clammy" overnight.  He does admit to having a very slight and brief episode of chest pain overnight but states his symptoms have completely resolved today.  Afebrile with vitals grossly unremarkable.  Patient otherwise very well-appearing on exam and in no apparent distress    DDx:  Including but not limited to chills, allergy/irritant, viral, URI, UTI, STEMI, NSTEMI, ACS, CAD, pneumonia, pleural effusion, pneumothorax    Amount and/or Complexity of Data Reviewed  Labs:  Decision-making details documented in ED Course.               ED Course as of 04/02/25 1715   Wed Apr 02, 2025   1400 POCT glucose(!)  Unremarkable [KS]   1536 CBC auto differential(!)  Grossly unremarkable.  No elevation WBC [KS]   1536 Comprehensive metabolic panel(!)  Grossly unremarkable [KS]   1536 Troponin I #1  WNL [KS]   1536 BNP  WNL [KS]   1558 Urinalysis Microscopic  No significant evidence of urinary infection [KS]   1702 Patient has continued have no complaints of chest pain while in ED. Second troponin is pending [KS]   1708 Troponin I #2  WNL [KS]   1711 Patient continues to rest comfortably.  Lengthy discussion was made with patient.  Medical decision making was performed.  Patient understands he does have high cardiac risk due to past medical history.  He does not wish to proceed with hospitalization at this time and he states he does feel comfortable returning home with close outpatient follow-up.  He has continued to remain very well-appearing in ED with stable vitals and grossly unremarkable cardiac evaluation today.  He did also have recent stress test with no significant findings.  Stable for discharge at this time but with very high ED return precautions.  Patient states his understanding and agrees with plan [KS]      ED Course User Index  [KS] Geronimo Parkinson PA-C                           Clinical Impression:  Final diagnoses:  [R07.9] Chest pain          ED Disposition Condition    " Discharge Stable          ED Prescriptions    None       Follow-up Information       Follow up With Specialties Details Why Contact Info    Erinn Morales MD Internal Medicine   [address] [facility] [address] [phone]                 [1]   Social History  Tobacco Use    Smoking status: Never     Passive exposure: Never    Smokeless tobacco: Never   Substance Use Topics    Alcohol use: No    Drug use: No        Geronimo Parkinson PA-C  04/02/25 4747

## 2025-04-02 NOTE — DISCHARGE INSTRUCTIONS

## 2025-06-23 ENCOUNTER — TELEPHONE (OUTPATIENT)
Dept: CARDIOLOGY | Facility: CLINIC | Age: 70
End: 2025-06-23
Payer: MEDICARE

## 2025-06-23 ENCOUNTER — OFFICE VISIT (OUTPATIENT)
Dept: CARDIOLOGY | Facility: CLINIC | Age: 70
End: 2025-06-23
Payer: MEDICARE

## 2025-06-23 VITALS
HEART RATE: 78 BPM | DIASTOLIC BLOOD PRESSURE: 66 MMHG | SYSTOLIC BLOOD PRESSURE: 116 MMHG | WEIGHT: 155.88 LBS | OXYGEN SATURATION: 95 % | HEIGHT: 66 IN | BODY MASS INDEX: 25.05 KG/M2

## 2025-06-23 DIAGNOSIS — E78.5 HYPERLIPIDEMIA WITH TARGET LDL LESS THAN 130: ICD-10-CM

## 2025-06-23 DIAGNOSIS — I25.2 HISTORY OF ST ELEVATION MYOCARDIAL INFARCTION (STEMI): Primary | Chronic | ICD-10-CM

## 2025-06-23 DIAGNOSIS — Z95.5 STENTED CORONARY ARTERY: Chronic | ICD-10-CM

## 2025-06-23 DIAGNOSIS — I10 BENIGN ESSENTIAL HTN: ICD-10-CM

## 2025-06-23 PROCEDURE — 1159F MED LIST DOCD IN RCRD: CPT | Mod: CPTII,S$GLB,, | Performed by: INTERNAL MEDICINE

## 2025-06-23 PROCEDURE — 3074F SYST BP LT 130 MM HG: CPT | Mod: CPTII,S$GLB,, | Performed by: INTERNAL MEDICINE

## 2025-06-23 PROCEDURE — 3288F FALL RISK ASSESSMENT DOCD: CPT | Mod: CPTII,S$GLB,, | Performed by: INTERNAL MEDICINE

## 2025-06-23 PROCEDURE — 1160F RVW MEDS BY RX/DR IN RCRD: CPT | Mod: CPTII,S$GLB,, | Performed by: INTERNAL MEDICINE

## 2025-06-23 PROCEDURE — G2211 COMPLEX E/M VISIT ADD ON: HCPCS | Mod: S$GLB,,, | Performed by: INTERNAL MEDICINE

## 2025-06-23 PROCEDURE — 4010F ACE/ARB THERAPY RXD/TAKEN: CPT | Mod: CPTII,S$GLB,, | Performed by: INTERNAL MEDICINE

## 2025-06-23 PROCEDURE — 1126F AMNT PAIN NOTED NONE PRSNT: CPT | Mod: CPTII,S$GLB,, | Performed by: INTERNAL MEDICINE

## 2025-06-23 PROCEDURE — 99214 OFFICE O/P EST MOD 30 MIN: CPT | Mod: S$GLB,,, | Performed by: INTERNAL MEDICINE

## 2025-06-23 PROCEDURE — 3078F DIAST BP <80 MM HG: CPT | Mod: CPTII,S$GLB,, | Performed by: INTERNAL MEDICINE

## 2025-06-23 PROCEDURE — 1101F PT FALLS ASSESS-DOCD LE1/YR: CPT | Mod: CPTII,S$GLB,, | Performed by: INTERNAL MEDICINE

## 2025-06-23 PROCEDURE — 3008F BODY MASS INDEX DOCD: CPT | Mod: CPTII,S$GLB,, | Performed by: INTERNAL MEDICINE

## 2025-06-23 PROCEDURE — 99999 PR PBB SHADOW E&M-EST. PATIENT-LVL III: CPT | Mod: PBBFAC,,, | Performed by: INTERNAL MEDICINE

## 2025-06-23 RX ORDER — LOSARTAN POTASSIUM 25 MG/1
25 TABLET ORAL DAILY
Qty: 90 TABLET | Refills: 3 | Status: CANCELLED | OUTPATIENT
Start: 2025-06-23 | End: 2026-06-23

## 2025-06-23 RX ORDER — LOSARTAN POTASSIUM 25 MG/1
25 TABLET ORAL DAILY
Qty: 90 TABLET | Refills: 3 | Status: SHIPPED | OUTPATIENT
Start: 2025-06-23 | End: 2026-06-23

## 2025-06-23 NOTE — PROGRESS NOTES
Subjective:   @Patient ID:  Rigoberto Quintana is a 70 y.o. male who presents for evaluation of coronary artery disease, history of STEMI, V-tach cardiac arrest    HPI:   6/2025:  F/U.  PET stress test was negative for ischemia.  Overall he is doing well.  Denies angina or angina equivalent at this time.  Compliant with meds.  He is active.  He finished cardiac rehab and still goes 2 times per week.  He walks      2/13/2025:  Follow up.  He has been doing very well.  Started cardiac rehab and doing very well.  No significant cardiac symptoms.  Compliant with aspirin and Brilinta.  LDL at goal.  BP well-controlled    12/17/2024:  Very pleasant gentleman here for follow up post hospital discharge.  Patient was admitted in 12/06/2024 with STEMI, cardiac arrest, and cardiogenic shock.  [facility] with left main critical stenosis post emergent PCI with IABP support due to cardiogenic shock.  Patient recovered very well.  EF improved.  Responded well to optimal medical therapy.  He is doing great today.  Has been walking without any chest pain.  His breathing improved significantly.  He is religiously compliant with his medications.  Blood pressure log at home is great.       PMH:   Past Medical History:   Diagnosis Date    Diabetes mellitus, type 2     Hyperlipidemia     Hypertension         SH:  No tobacco abuse    FH:  No family history of premature CAD    Prior cardiovascular  Hx  --------------------------------    - Heart Catheterization 12/06/2024  Access:  Right radial artery 5-6 [place] sheath  Right common femoral artery 8 [place] sheath  Left common femoral vein 7 [place] sheath        LM:  JAME II flow 99% stenosis mid left main reduced to 0% post  LAD: JAME II flow ostial 50% stenosis  LCx:  JAME II flow proximal circ 40-50% stenosis  RCA: JAME III flow  Dominance right     LVgram: LVEDP 17 mmHg     Intervention:   Status post successful emergent PTCA and stenting to left main 99% stenosis with 4.5 x 16 mm RIAN post  dilated with 4.5 NC balloon.  IVUS guided with excellent results.  We had to intervene on the left main as the patient was unstable with frequent PVCs and had V-tach and was shocked 3 times by EMS.  The procedure was performed with mechanical support with intra-aortic balloon pump that was placed before the coronary intervention.  He required Levophed during the procedure that was weaned off.  BiPAP was placed prior to the procedure.     2. Intra-aortic balloon pump placement for mechanical support prior to PCI.  Augmented pressure 123 mm Hg.  Balloon pump placed at 1:1 position confirmed with fluoro.      3. Right heart catheterization for cardiogenic shock             - ECHO 12/09/2024    Left Ventricle: The left ventricle is normal in size. There is concentric remodeling. Regional wall motion abnormalities present. See diagram for wall motion findings. There is normal systolic function with a visually estimated ejection fraction of 55 - 60%. Grade I diastolic dysfunction.    Right Ventricle: Normal right ventricular cavity size. Systolic function is normal.    Mitral Valve: There is mild regurgitation.    Tricuspid Valve: There is mild regurgitation.    Pulmonary Artery: The estimated pulmonary artery systolic pressure is 43 mmHg.    IVC/SVC: Normal venous pressure at 3 mmHg.       - EKG 12/07/2024 sinus rhythm, prolonged QT    PET stress test 03/05/2025:    The myocardial perfusion images show no evidence of ischemia or scar.    The whole heart absolute myocardial perfusion values were elevated at rest, mildly reduced during stress and CFR is moderately reduced in part due to elevated resting flow.    CT attenuation images demonstrate severe diffuse coronary calcifications in the LAD and RCA territory and no aortic calcifications.    The gated perfusion images showed an ejection fraction of 67% at rest and 71% during stress. A normal ejection fraction is greater than 47%.    There is normal wall motion at rest  and normal wall motion during stress.    The study's ECG is negative for ischemia.    There is no prior study for comparison.        The ASCVD Risk score (Eze WILKINS, et al., 2019) failed to calculate for the following reasons:    Risk score cannot be calculated because patient has a medical history suggesting prior/existing ASCVD      Patient Active Problem List    Diagnosis Date Noted    Stented coronary artery 12/17/2024    History of ST elevation myocardial infarction (STEMI) 12/07/2024    Erectile dysfunction 10/09/2015    Hyperlipidemia with target LDL less than 130 07/17/2015    Benign essential HTN 07/17/2015    Diabetes mellitus due to underlying condition without complications 07/17/2015                    LAST HbA1c  Lab Results   Component Value Date    HGBA1C 7.3 (H) 12/07/2024       Lipid panel  Lab Results   Component Value Date    CHOL 155 12/09/2024    CHOL 146 10/13/2017    CHOL 167 03/01/2017     Lab Results   Component Value Date    HDL 64 12/09/2024    HDL 56 10/13/2017    HDL 58 03/01/2017     Lab Results   Component Value Date    LDLCALC 73.4 12/09/2024    LDLCALC 76.4 10/13/2017    LDLCALC 95.0 03/01/2017     Lab Results   Component Value Date    TRIG 88 12/09/2024    TRIG 68 10/13/2017    TRIG 70 03/01/2017     Lab Results   Component Value Date    CHOLHDL 41.3 12/09/2024    CHOLHDL 38.4 10/13/2017    CHOLHDL 34.7 03/01/2017            Review of Systems   Constitutional: Negative for chills and fever.   HENT:  Negative for hearing loss and nosebleeds.    Eyes:  Negative for blurred vision.   Cardiovascular:         As in HPI   Respiratory:  Negative for hemoptysis and shortness of breath.    Hematologic/Lymphatic: Negative for bleeding problem.   Skin:  Negative for itching.   Musculoskeletal:  Negative for falls.   Gastrointestinal:  Negative for abdominal pain and hematochezia.   Genitourinary:  Negative for hematuria.   Neurological:  Negative for dizziness and loss of balance.    Psychiatric/Behavioral:  Negative for altered mental status and depression.        Objective:   Physical Exam  Constitutional:       Appearance: He is well-developed.   HENT:      Head: Normocephalic and atraumatic.   Eyes:      Conjunctiva/sclera: Conjunctivae normal.   Neck:      Vascular: No carotid bruit or JVD.   Cardiovascular:      Rate and Rhythm: Normal rate and regular rhythm.      Pulses:           Carotid pulses are 2+ on the right side and 2+ on the left side.       Radial pulses are 2+ on the right side and 2+ on the left side.      Heart sounds: Normal heart sounds. No murmur heard.     No friction rub. No gallop.   Pulmonary:      Effort: Pulmonary effort is normal. No respiratory distress.      Breath sounds: Normal breath sounds. No stridor. No wheezing.   Musculoskeletal:      Cervical back: Neck supple.   Skin:     General: Skin is warm and dry.   Neurological:      Mental Status: He is alert and oriented to person, place, and time.   Psychiatric:         Behavior: Behavior normal.         Assessment:     1. History of ST elevation myocardial infarction (STEMI)    2. Hyperlipidemia with target LDL less than 130    3. Stented coronary artery        Plan:   1. History of cardiac arrest/left main STEMI    Status post emergent PCI to left main IABP support.  Residual disease in the ostial LAD and circumflex moderate nonobstructive angiographically.    Continue aspirin and Brilinta for 12 months without interruption  Continue high-intensity statin.  LDL goal lower than 55   Continue Toprol and losartan  Lasix p.r.n.      2. Hypertension  BP well-controlled  Continue current regimen    3. Hyperlipidemia  Continue high-intensity statin  Lipid panel pending      4. Anemia  Improved    5. Type 2 DM  Continue Jardiance  Follow up with Endocrinology team  Continue Ozempic        Six-months follow-up or sooner p.r.n.    Visit today included increased complexity associated with the care of the episodic  "problem coronary artery disease, hypertension, hyperlipidemia, anemia addressed and managing the longitudinal care of the patient due to the serious and/or complex managed problems     Pertinent cardiac images and EKG reviewed independently.    Continue with current medical plan and lifestyle changes.  Return sooner for concerns or questions. If symptoms persist go to the ED  I have reviewed all pertinent data including patient's medical history in detail and updated the computerized patient record.     No orders of the defined types were placed in this encounter.      Follow up as scheduled.     He expressed verbal understanding and agreed with the plan    Patient's Medications   New Prescriptions    No medications on file   Previous Medications    ALCOHOL SWABS (ALCOHOL PREP PADS) PADM    Test blood sugar 2 times daily and PRN    ASPIRIN (ECOTRIN) 81 MG EC TABLET    Take 1 tablet (81 mg total) by mouth once daily.    BD ULTRA-FINE SONAL PEN NEEDLES 32 GAUGE X 5/32" NDLE    USE WHICHEVER NEEDLE COMBINES WITH THE VICTOZA PEN PLEASE.( MICRO ).    BLOOD SUGAR DIAGNOSTIC STRP    Test blood sugar 2 times daily and PRN; DISP: Whichever brand is covered by insurance    BLOOD-GLUCOSE METER KIT    Test blood sugar 2 times daily and PRN; DISP: Whichever brand is covered under insurance    EMPAGLIFLOZIN (JARDIANCE) 10 MG TABLET    Take 1 tablet (10 mg total) by mouth once daily.    FLUARIX QUAD 2547-3207, PF, 60 MCG (15 MCG X 4)/0.5 ML VACCINE    TO BE ADMINISTERED BY PHARMACIST FOR IMMUNIZATION    FUROSEMIDE (LASIX) 20 MG TABLET    Take 1 tablet (20 mg total) by mouth daily as needed (Swelling, Shortness of breath 2 pounds wt gain in 24 hours).    JANUMET -1,000 MG TM24    TAKE 1 TABLET BY MOUTH ONCE DAILY.    LANCETS MISC    Test blood sugar 2 times daily and PRN; DISP: whichever brand is covered by insurance    LOSARTAN (COZAAR) 25 MG TABLET    Take 0.5 tablets (12.5 mg total) by mouth once daily.    METOPROLOL " SUCCINATE (TOPROL-XL) 25 MG 24 HR TABLET    Take 0.5 tablets (12.5 mg total) by mouth once daily.    OZEMPIC 1 MG/DOSE (4 MG/3 ML)    as directed Subcutaneous    ROSUVASTATIN (CRESTOR) 40 MG TAB    Take 1 tablet (40 mg total) by mouth every evening.    TICAGRELOR (BRILINTA) 90 MG TABLET    Take 1 tablet (90 mg total) by mouth 2 (two) times daily.   Modified Medications    No medications on file   Discontinued Medications    No medications on file        Lokesh Quiroz MD, FACC, RPVI  Heart and Vascular Interventional Cardiology

## 2025-06-23 NOTE — TELEPHONE ENCOUNTER
Copied from CRM #[id number]. Topic: General Inquiry - Patient Advice  >> Jun 23, 2025  8:56 AM Brian wrote:  Type:  Needs Medical Advice    Who Called: PT  Symptoms (please be specific): NO SYMPTOMS  How long has patient had these symptoms:  -  Pharmacy name and phone #:  -  Would the patient rather a call back or a response via [facility]? CALL  Best Call Back Number: [phone]   Additional Information: Pt states he would like a call back from office to see if he needs to fast for blood work. Thank you

## 2025-06-24 ENCOUNTER — PATIENT MESSAGE (OUTPATIENT)
Dept: CARDIOLOGY | Facility: CLINIC | Age: 70
End: 2025-06-24
Payer: MEDICARE

## 2025-06-24 NOTE — TELEPHONE ENCOUNTER
LDL results is okay.  It is lower than 70.  I prefer to see it lower than 55 if possible.  We can continue the same medications for time being and repeat levels again in six-months.  If levels still above 55 then we will add a 2nd medication

## 2025-08-26 RX ORDER — LOSARTAN POTASSIUM 25 MG/1
25 TABLET ORAL DAILY
Qty: 90 TABLET | Refills: 3 | OUTPATIENT
Start: 2025-08-26 | End: 2026-08-26

## 2025-08-29 ENCOUNTER — PATIENT MESSAGE (OUTPATIENT)
Dept: CARDIOLOGY | Facility: CLINIC | Age: 70
End: 2025-08-29
Payer: MEDICARE

## (undated) DEVICE — CATH SWAN GANZ STND 7FR

## (undated) DEVICE — CATH EAGLE EYE PLATINUM

## (undated) DEVICE — HEMOSTAT VASC BAND REG 24CM

## (undated) DEVICE — VISIPAQUE 320 200ML +PK

## (undated) DEVICE — SHEATH PINNACLE 8FR

## (undated) DEVICE — Device

## (undated) DEVICE — CATH EMERGE MR 8 X 3.50

## (undated) DEVICE — SPIKE SHORT LG BORE 1-WAY 2IN

## (undated) DEVICE — CATH NC EMERGE MR 4.50X12MM

## (undated) DEVICE — KIT INTRODUCER MICROPUNCTR 4F

## (undated) DEVICE — GUIDEWIRE EMERALD 150CM PTFE

## (undated) DEVICE — CATH OPTITORQUE TIGER 5F 100CM

## (undated) DEVICE — KIT GLIDESHEATH SLEND 6FR 10CM

## (undated) DEVICE — SHEATH INTRODUCER 7FR 11CM

## (undated) DEVICE — KIT INTRODUCER W/GUIDEWIRE

## (undated) DEVICE — DRAPE ANGIO BRACH 38X44IN

## (undated) DEVICE — CATH EMERGE MR 12 X 3.50

## (undated) DEVICE — GUIDEWIRE EMERALD .035IN 260CM

## (undated) DEVICE — GUIDE LAUNCHER 6FR EBU 3.5

## (undated) DEVICE — VALVE CONTROL COPILOT

## (undated) DEVICE — SHEATH INTRODUCER 6FR 11CM

## (undated) DEVICE — KIT LEFT HEART MANIFOLD CUSTOM

## (undated) DEVICE — GUIDEWIRE RUNTHROUGH EF 180CM

## (undated) DEVICE — GUIDEWIRE WHOLEY STD STR 260CM

## (undated) DEVICE — PAD DEFIB CADENCE ADULT R2

## (undated) DEVICE — CATH IMPULSE 5FR PIGTAIL 125CM